# Patient Record
Sex: MALE | Race: AMERICAN INDIAN OR ALASKA NATIVE | NOT HISPANIC OR LATINO | URBAN - METROPOLITAN AREA
[De-identification: names, ages, dates, MRNs, and addresses within clinical notes are randomized per-mention and may not be internally consistent; named-entity substitution may affect disease eponyms.]

---

## 2021-05-29 ENCOUNTER — INPATIENT (INPATIENT)
Facility: HOSPITAL | Age: 56
LOS: 1 days | Discharge: HOME | End: 2021-05-31
Attending: COLON & RECTAL SURGERY | Admitting: COLON & RECTAL SURGERY
Payer: MEDICAID

## 2021-05-29 VITALS
RESPIRATION RATE: 18 BRPM | TEMPERATURE: 99 F | HEART RATE: 82 BPM | OXYGEN SATURATION: 98 % | DIASTOLIC BLOOD PRESSURE: 85 MMHG | WEIGHT: 179.9 LBS | SYSTOLIC BLOOD PRESSURE: 125 MMHG | HEIGHT: 72 IN

## 2021-05-29 LAB
APTT BLD: 33.1 SEC — SIGNIFICANT CHANGE UP (ref 27–39.2)
BASOPHILS # BLD AUTO: 0.03 K/UL — SIGNIFICANT CHANGE UP (ref 0–0.2)
BASOPHILS NFR BLD AUTO: 0.3 % — SIGNIFICANT CHANGE UP (ref 0–1)
EOSINOPHIL # BLD AUTO: 0.11 K/UL — SIGNIFICANT CHANGE UP (ref 0–0.7)
EOSINOPHIL NFR BLD AUTO: 0.9 % — SIGNIFICANT CHANGE UP (ref 0–8)
HCT VFR BLD CALC: 47.7 % — SIGNIFICANT CHANGE UP (ref 42–52)
HGB BLD-MCNC: 16.5 G/DL — SIGNIFICANT CHANGE UP (ref 14–18)
IMM GRANULOCYTES NFR BLD AUTO: 0.3 % — SIGNIFICANT CHANGE UP (ref 0.1–0.3)
INR BLD: 1.15 RATIO — SIGNIFICANT CHANGE UP (ref 0.65–1.3)
LYMPHOCYTES # BLD AUTO: 1.63 K/UL — SIGNIFICANT CHANGE UP (ref 1.2–3.4)
LYMPHOCYTES # BLD AUTO: 13.7 % — LOW (ref 20.5–51.1)
MCHC RBC-ENTMCNC: 31 PG — SIGNIFICANT CHANGE UP (ref 27–31)
MCHC RBC-ENTMCNC: 34.6 G/DL — SIGNIFICANT CHANGE UP (ref 32–37)
MCV RBC AUTO: 89.7 FL — SIGNIFICANT CHANGE UP (ref 80–94)
MONOCYTES # BLD AUTO: 1.29 K/UL — HIGH (ref 0.1–0.6)
MONOCYTES NFR BLD AUTO: 10.8 % — HIGH (ref 1.7–9.3)
NEUTROPHILS # BLD AUTO: 8.81 K/UL — HIGH (ref 1.4–6.5)
NEUTROPHILS NFR BLD AUTO: 74 % — SIGNIFICANT CHANGE UP (ref 42.2–75.2)
NRBC # BLD: 0 /100 WBCS — SIGNIFICANT CHANGE UP (ref 0–0)
PLATELET # BLD AUTO: 178 K/UL — SIGNIFICANT CHANGE UP (ref 130–400)
PROTHROM AB SERPL-ACNC: 13.2 SEC — HIGH (ref 9.95–12.87)
RBC # BLD: 5.32 M/UL — SIGNIFICANT CHANGE UP (ref 4.7–6.1)
RBC # FLD: 12.5 % — SIGNIFICANT CHANGE UP (ref 11.5–14.5)
WBC # BLD: 11.91 K/UL — HIGH (ref 4.8–10.8)
WBC # FLD AUTO: 11.91 K/UL — HIGH (ref 4.8–10.8)

## 2021-05-29 PROCEDURE — 99284 EMERGENCY DEPT VISIT MOD MDM: CPT | Mod: 57,25

## 2021-05-29 PROCEDURE — 99285 EMERGENCY DEPT VISIT HI MDM: CPT

## 2021-05-29 PROCEDURE — 74177 CT ABD & PELVIS W/CONTRAST: CPT | Mod: 26,MA

## 2021-05-29 RX ORDER — MORPHINE SULFATE 50 MG/1
4 CAPSULE, EXTENDED RELEASE ORAL ONCE
Refills: 0 | Status: DISCONTINUED | OUTPATIENT
Start: 2021-05-29 | End: 2021-05-29

## 2021-05-29 RX ORDER — CEFOTETAN DISODIUM 1 G
2 VIAL (EA) INJECTION ONCE
Refills: 0 | Status: COMPLETED | OUTPATIENT
Start: 2021-05-29 | End: 2021-05-29

## 2021-05-29 RX ORDER — ONDANSETRON 8 MG/1
4 TABLET, FILM COATED ORAL ONCE
Refills: 0 | Status: COMPLETED | OUTPATIENT
Start: 2021-05-29 | End: 2021-05-29

## 2021-05-29 RX ORDER — SODIUM CHLORIDE 9 MG/ML
1000 INJECTION INTRAMUSCULAR; INTRAVENOUS; SUBCUTANEOUS ONCE
Refills: 0 | Status: COMPLETED | OUTPATIENT
Start: 2021-05-29 | End: 2021-05-29

## 2021-05-29 RX ADMIN — ONDANSETRON 4 MILLIGRAM(S): 8 TABLET, FILM COATED ORAL at 23:38

## 2021-05-29 RX ADMIN — MORPHINE SULFATE 4 MILLIGRAM(S): 50 CAPSULE, EXTENDED RELEASE ORAL at 23:39

## 2021-05-29 RX ADMIN — SODIUM CHLORIDE 1000 MILLILITER(S): 9 INJECTION INTRAMUSCULAR; INTRAVENOUS; SUBCUTANEOUS at 23:38

## 2021-05-29 NOTE — ED PROVIDER NOTE - ATTENDING CONTRIBUTION TO CARE
I personally evaluated the patient. I reviewed the Resident’s or Physician Assistant’s note (as assigned above), and agree with the findings and plan except as documented in my note.  Chart reviewed.  55 y/o Chinese male, no PMH, presents with RLQ pain since yesterday.  No fever, anorexia or vomiting.  Exam shows alert patient in no distress, HEENT NCAT, lungs clear, RR S1S2, abdomen soft +RLQ tenderness +BS, +guarding, no rebound, no CCE.

## 2021-05-29 NOTE — ED PROVIDER NOTE - CLINICAL SUMMARY MEDICAL DECISION MAKING FREE TEXT BOX
Labs noted for WBC 11k.  CT abdo +appendicitis.  Given IVF, morphine, Zofran and cefotetan.  Will admit to surgery.

## 2021-05-29 NOTE — ED PROVIDER NOTE - PHYSICAL EXAMINATION
CONSTITUTIONAL: Well-appearing; well-nourished; in no apparent distress.   EYES: PERRL; EOM intact.   CARDIOVASCULAR: Normal S1, S2; no murmurs, rubs, or gallops.   RESPIRATORY: Normal chest excursion with respiration; breath sounds clear and equal bilaterally; no wheezes, rhonchi, or rales.  GI/: + RLQ/LLQ pain tenderness with guarding/rebound. mildly distended abdomen. + Rovsig and Arslan signs. Normal bowel sounds; no palpable organomegaly.   SKIN: Normal for age and race; warm; dry; good turgor; no apparent lesions or exudate.   NEURO/PSYCH: A & O x 4; grossly unremarkable.

## 2021-05-29 NOTE — ED PROVIDER NOTE - NS_BEDUNITTYPES_ED_ALL_ED
If no improvement after 2 weeks, Patient instructed to see DR Georgie Zavala for further evaluation. MED/SURG

## 2021-05-29 NOTE — ED PROVIDER NOTE - OBJECTIVE STATEMENT
57 yo male no sig hx present c/o RLQ pain x 1 day. pain worsen throughout the day. pain worsen with palpation and movement/walking. denies similar pain in the past. Denies fever/chill/nausea/vomiting/diarrhea/change of appetite/constipation and urinary sxs. Denies HA/dizziness/chest pain/sob/palpitations and pain to extremities and ambulatory difficulty.

## 2021-05-30 ENCOUNTER — RESULT REVIEW (OUTPATIENT)
Age: 56
End: 2021-05-30

## 2021-05-30 LAB
ALBUMIN SERPL ELPH-MCNC: 4.1 G/DL — SIGNIFICANT CHANGE UP (ref 3.5–5.2)
ALP SERPL-CCNC: 57 U/L — SIGNIFICANT CHANGE UP (ref 30–115)
ALT FLD-CCNC: 40 U/L — SIGNIFICANT CHANGE UP (ref 0–41)
ANION GAP SERPL CALC-SCNC: 10 MMOL/L — SIGNIFICANT CHANGE UP (ref 7–14)
ANION GAP SERPL CALC-SCNC: 11 MMOL/L — SIGNIFICANT CHANGE UP (ref 7–14)
AST SERPL-CCNC: 77 U/L — HIGH (ref 0–41)
BILIRUB SERPL-MCNC: 1.8 MG/DL — HIGH (ref 0.2–1.2)
BLD GP AB SCN SERPL QL: SIGNIFICANT CHANGE UP
BUN SERPL-MCNC: 11 MG/DL — SIGNIFICANT CHANGE UP (ref 10–20)
BUN SERPL-MCNC: 12 MG/DL — SIGNIFICANT CHANGE UP (ref 10–20)
CALCIUM SERPL-MCNC: 8.6 MG/DL — SIGNIFICANT CHANGE UP (ref 8.5–10.1)
CALCIUM SERPL-MCNC: 9.1 MG/DL — SIGNIFICANT CHANGE UP (ref 8.5–10.1)
CHLORIDE SERPL-SCNC: 100 MMOL/L — SIGNIFICANT CHANGE UP (ref 98–110)
CHLORIDE SERPL-SCNC: 104 MMOL/L — SIGNIFICANT CHANGE UP (ref 98–110)
CO2 SERPL-SCNC: 25 MMOL/L — SIGNIFICANT CHANGE UP (ref 17–32)
CO2 SERPL-SCNC: 25 MMOL/L — SIGNIFICANT CHANGE UP (ref 17–32)
CREAT SERPL-MCNC: 0.9 MG/DL — SIGNIFICANT CHANGE UP (ref 0.7–1.5)
CREAT SERPL-MCNC: 1 MG/DL — SIGNIFICANT CHANGE UP (ref 0.7–1.5)
FLUAV AG NPH QL: NEGATIVE COUNTS — SIGNIFICANT CHANGE UP
FLUBV AG NPH QL: NEGATIVE COUNTS — SIGNIFICANT CHANGE UP
GLUCOSE SERPL-MCNC: 119 MG/DL — HIGH (ref 70–99)
GLUCOSE SERPL-MCNC: 123 MG/DL — HIGH (ref 70–99)
HCT VFR BLD CALC: 44.7 % — SIGNIFICANT CHANGE UP (ref 42–52)
HGB BLD-MCNC: 15.6 G/DL — SIGNIFICANT CHANGE UP (ref 14–18)
LIDOCAIN IGE QN: 35 U/L — SIGNIFICANT CHANGE UP (ref 7–60)
MCHC RBC-ENTMCNC: 31.4 PG — HIGH (ref 27–31)
MCHC RBC-ENTMCNC: 34.9 G/DL — SIGNIFICANT CHANGE UP (ref 32–37)
MCV RBC AUTO: 89.9 FL — SIGNIFICANT CHANGE UP (ref 80–94)
NRBC # BLD: 0 /100 WBCS — SIGNIFICANT CHANGE UP (ref 0–0)
PLATELET # BLD AUTO: 161 K/UL — SIGNIFICANT CHANGE UP (ref 130–400)
POTASSIUM SERPL-MCNC: 3.7 MMOL/L — SIGNIFICANT CHANGE UP (ref 3.5–5)
POTASSIUM SERPL-MCNC: 6.3 MMOL/L — CRITICAL HIGH (ref 3.5–5)
POTASSIUM SERPL-SCNC: 3.7 MMOL/L — SIGNIFICANT CHANGE UP (ref 3.5–5)
POTASSIUM SERPL-SCNC: 6.3 MMOL/L — CRITICAL HIGH (ref 3.5–5)
PROT SERPL-MCNC: 7.5 G/DL — SIGNIFICANT CHANGE UP (ref 6–8)
RBC # BLD: 4.97 M/UL — SIGNIFICANT CHANGE UP (ref 4.7–6.1)
RBC # FLD: 12.5 % — SIGNIFICANT CHANGE UP (ref 11.5–14.5)
RSV RNA NPH QL NAA+NON-PROBE: NEGATIVE COUNTS — SIGNIFICANT CHANGE UP
SARS-COV-2 RNA SPEC QL NAA+PROBE: NEGATIVE COUNTS — SIGNIFICANT CHANGE UP
SODIUM SERPL-SCNC: 136 MMOL/L — SIGNIFICANT CHANGE UP (ref 135–146)
SODIUM SERPL-SCNC: 139 MMOL/L — SIGNIFICANT CHANGE UP (ref 135–146)
WBC # BLD: 11.1 K/UL — HIGH (ref 4.8–10.8)
WBC # FLD AUTO: 11.1 K/UL — HIGH (ref 4.8–10.8)

## 2021-05-30 PROCEDURE — 44970 LAPAROSCOPY APPENDECTOMY: CPT

## 2021-05-30 PROCEDURE — 71045 X-RAY EXAM CHEST 1 VIEW: CPT | Mod: 26

## 2021-05-30 PROCEDURE — 88304 TISSUE EXAM BY PATHOLOGIST: CPT | Mod: 26

## 2021-05-30 PROCEDURE — 93010 ELECTROCARDIOGRAM REPORT: CPT | Mod: NC

## 2021-05-30 RX ORDER — HEPARIN SODIUM 5000 [USP'U]/ML
5000 INJECTION INTRAVENOUS; SUBCUTANEOUS EVERY 8 HOURS
Refills: 0 | Status: DISCONTINUED | OUTPATIENT
Start: 2021-05-30 | End: 2021-05-31

## 2021-05-30 RX ORDER — ONDANSETRON 8 MG/1
4 TABLET, FILM COATED ORAL EVERY 6 HOURS
Refills: 0 | Status: DISCONTINUED | OUTPATIENT
Start: 2021-05-30 | End: 2021-05-30

## 2021-05-30 RX ORDER — OXYCODONE AND ACETAMINOPHEN 5; 325 MG/1; MG/1
1 TABLET ORAL ONCE
Refills: 0 | Status: DISCONTINUED | OUTPATIENT
Start: 2021-05-30 | End: 2021-05-31

## 2021-05-30 RX ORDER — CEFOTETAN DISODIUM 1 G
1 VIAL (EA) INJECTION EVERY 12 HOURS
Refills: 0 | Status: DISCONTINUED | OUTPATIENT
Start: 2021-05-30 | End: 2021-05-30

## 2021-05-30 RX ORDER — CEFOTETAN DISODIUM 1 G
1 VIAL (EA) INJECTION EVERY 12 HOURS
Refills: 0 | Status: DISCONTINUED | OUTPATIENT
Start: 2021-05-30 | End: 2021-05-31

## 2021-05-30 RX ORDER — MORPHINE SULFATE 50 MG/1
2 CAPSULE, EXTENDED RELEASE ORAL EVERY 4 HOURS
Refills: 0 | Status: DISCONTINUED | OUTPATIENT
Start: 2021-05-30 | End: 2021-05-30

## 2021-05-30 RX ORDER — HYDROMORPHONE HYDROCHLORIDE 2 MG/ML
0.5 INJECTION INTRAMUSCULAR; INTRAVENOUS; SUBCUTANEOUS
Refills: 0 | Status: DISCONTINUED | OUTPATIENT
Start: 2021-05-30 | End: 2021-05-31

## 2021-05-30 RX ORDER — FAMOTIDINE 10 MG/ML
20 INJECTION INTRAVENOUS EVERY 12 HOURS
Refills: 0 | Status: DISCONTINUED | OUTPATIENT
Start: 2021-05-30 | End: 2021-05-30

## 2021-05-30 RX ORDER — SODIUM CHLORIDE 9 MG/ML
1000 INJECTION INTRAMUSCULAR; INTRAVENOUS; SUBCUTANEOUS
Refills: 0 | Status: DISCONTINUED | OUTPATIENT
Start: 2021-05-30 | End: 2021-05-30

## 2021-05-30 RX ORDER — FAMOTIDINE 10 MG/ML
20 INJECTION INTRAVENOUS EVERY 12 HOURS
Refills: 0 | Status: DISCONTINUED | OUTPATIENT
Start: 2021-05-30 | End: 2021-05-31

## 2021-05-30 RX ORDER — MORPHINE SULFATE 50 MG/1
2 CAPSULE, EXTENDED RELEASE ORAL EVERY 4 HOURS
Refills: 0 | Status: DISCONTINUED | OUTPATIENT
Start: 2021-05-30 | End: 2021-05-31

## 2021-05-30 RX ORDER — SODIUM CHLORIDE 9 MG/ML
1000 INJECTION, SOLUTION INTRAVENOUS
Refills: 0 | Status: DISCONTINUED | OUTPATIENT
Start: 2021-05-30 | End: 2021-05-31

## 2021-05-30 RX ADMIN — FAMOTIDINE 20 MILLIGRAM(S): 10 INJECTION INTRAVENOUS at 18:10

## 2021-05-30 RX ADMIN — HEPARIN SODIUM 5000 UNIT(S): 5000 INJECTION INTRAVENOUS; SUBCUTANEOUS at 15:28

## 2021-05-30 RX ADMIN — Medication 100 GRAM(S): at 00:33

## 2021-05-30 RX ADMIN — HEPARIN SODIUM 5000 UNIT(S): 5000 INJECTION INTRAVENOUS; SUBCUTANEOUS at 21:38

## 2021-05-30 RX ADMIN — MORPHINE SULFATE 2 MILLIGRAM(S): 50 CAPSULE, EXTENDED RELEASE ORAL at 11:28

## 2021-05-30 RX ADMIN — FAMOTIDINE 20 MILLIGRAM(S): 10 INJECTION INTRAVENOUS at 05:39

## 2021-05-30 RX ADMIN — SODIUM CHLORIDE 75 MILLILITER(S): 9 INJECTION, SOLUTION INTRAVENOUS at 10:59

## 2021-05-30 RX ADMIN — SODIUM CHLORIDE 75 MILLILITER(S): 9 INJECTION, SOLUTION INTRAVENOUS at 21:39

## 2021-05-30 RX ADMIN — Medication 100 GRAM(S): at 17:44

## 2021-05-30 RX ADMIN — MORPHINE SULFATE 4 MILLIGRAM(S): 50 CAPSULE, EXTENDED RELEASE ORAL at 09:45

## 2021-05-30 RX ADMIN — MORPHINE SULFATE 2 MILLIGRAM(S): 50 CAPSULE, EXTENDED RELEASE ORAL at 11:29

## 2021-05-30 RX ADMIN — SODIUM CHLORIDE 125 MILLILITER(S): 9 INJECTION INTRAMUSCULAR; INTRAVENOUS; SUBCUTANEOUS at 02:40

## 2021-05-30 NOTE — PRE-ANESTHESIA EVALUATION ADULT - NSANTHOSAYNRD_GEN_A_CORE
No. ROGER screening performed.  STOP BANG Legend: 0-2 = LOW Risk; 3-4 = INTERMEDIATE Risk; 5-8 = HIGH Risk

## 2021-05-30 NOTE — H&P ADULT - ATTENDING COMMENTS
Patient is a 56M with no PMH who presents with 1 day of abdominal pain and found to have acute appendicitis on CTAP    Patient seen and examined.    Abdomen - soft, TTP in RLQ, mildly distended    Vitals labs and images reviewed    Plan  - NPO  - IVF  - IV Cefotetan  - OR for laparoscopic possible open appendectomy

## 2021-05-30 NOTE — H&P ADULT - MUSCULOSKELETAL
Only take Warfarin 5 mg (1 tablet) tomorrow.  Then, resume normal dosing schedule of Warfarin 10 mg on Mondays, Wednesdays, and Fridays and 7.5 mg all other days.  Continue to elevate legs when sitting.  Follow up pending lab results.  Follow up as scheduled in 10 days, or sooner if symptoms persist or worsen.   No joint pain, swelling or deformity; no limitation of movement

## 2021-05-30 NOTE — PROGRESS NOTE ADULT - ATTENDING COMMENTS
56M POD1 s/p laparoscopic appendectomy for acute perforated appendicitis    Patient seen and examined.    Abdomen - soft, mildy distended, appropriately tender.  Wounds with dermabond in place.     Vitals labs and images reviewed    Plan  - regular diet  - 5 days of PO Augmentin given perforation  - DC home today vs tomorrow  - f/u as outpatient in 1-2 weeks.

## 2021-05-30 NOTE — PROGRESS NOTE ADULT - SUBJECTIVE AND OBJECTIVE BOX
.  Post Operative Note --> s/p Laparoscopic Appendectomy today      Patient    Pain controlled with pain medications.    Voided freely.  Denies N/V, subjective fever, chills, tremors, CP or SOB.         Vitals: T(F): 97.8 (05-30-21 @ 16:10), Max: 99.6 (05-30-21 @ 09:38)  HR: 104 (05-30-21 @ 16:10)  BP: 120/80 (05-30-21 @ 16:10) (112/71 - 154/89)  RR: 16 (05-30-21 @ 16:10)  SpO2: 95% (05-30-21 @ 10:53)          I&O's:  In:   05-30-21 @ 07:01  -  05-30-21 @ 17:30  --------------------------------------------------------  IN: 1150 mL      IV Fluids: lactated ringers. 1000 milliLiter(s) (75 mL/Hr) IV Continuous <Continuous>      Out:   05-30-21 @ 07:01  -  05-30-21 @ 17:30  --------------------------------------------------------  OUT: 420 mL      EBL:   05-30-21 @ 07:01  -  05-30-21 @ 17:30  --------------------------------------------------------  OUT: 20 mL      Voided Urine:   05-30-21 @ 07:01  -  05-30-21 @ 17:30  --------------------------------------------------------  OUT: 420 mL            Physical Examination:  General:  Awake, alert and conversant in NAD.   Lungs:  CTA bilaterally, No W/R/R.  Cor:  S1 & S2 RRR, No M/R/G.  Abd:  + BS, soft , no distention, + RLQ and incisional tenderness with palpation.  Tegaderm dressings C/D/I, no bleeding or discharge.  No rebound, guarding or peritoneal signs.   Ext:  No C/C/E,  No calf tenderness.  Neuro:  No focal deficits, no facial droop.        Medications: [Standing]  cefoTEtan  IVPB 1 Gram(s) IV Intermittent every 12 hours  famotidine Injectable 20 milliGRAM(s) IV Push every 12 hours  heparin   Injectable 5000 Unit(s) SubCutaneous every 8 hours  HYDROmorphone  Injectable 0.5 milliGRAM(s) IV Push every 10 minutes PRN  lactated ringers. 1000 milliLiter(s) IV Continuous <Continuous>  morphine  - Injectable 2 milliGRAM(s) IV Push every 4 hours PRN  oxycodone    5 mG/acetaminophen 325 mG 1 Tablet(s) Oral once PRN    Medications: [PRN]  cefoTEtan  IVPB 1 Gram(s) IV Intermittent every 12 hours  famotidine Injectable 20 milliGRAM(s) IV Push every 12 hours  heparin   Injectable 5000 Unit(s) SubCutaneous every 8 hours  HYDROmorphone  Injectable 0.5 milliGRAM(s) IV Push every 10 minutes PRN  lactated ringers. 1000 milliLiter(s) IV Continuous <Continuous>  morphine  - Injectable 2 milliGRAM(s) IV Push every 4 hours PRN  oxycodone    5 mG/acetaminophen 325 mG 1 Tablet(s) Oral once PRN          Labs:  No post operative labs ordered.       Imaging:  No post-operative imaging studies ordered.    .  Post Operative Note --> s/p Laparoscopic Appendectomy today      Patient reports some mild pain but feels well.   Voided freely.  Denies N/V, subjective fever, chills, tremors, CP or SOB.         Vitals: T(F): 97.8 (05-30-21 @ 16:10), Max: 99.6 (05-30-21 @ 09:38)  HR: 104 (05-30-21 @ 16:10)  BP: 120/80 (05-30-21 @ 16:10) (112/71 - 154/89)  RR: 16 (05-30-21 @ 16:10)  SpO2: 95% (05-30-21 @ 10:53)        I&O's:  In:   05-30-21 @ 07:01  -  05-30-21 @ 17:30  --------------------------------------------------------  IN: 1150 mL    IV Fluids: lactated ringers. 1000 milliLiter(s) (75 mL/Hr) IV Continuous <Continuous>    Out:   05-30-21 @ 07:01  -  05-30-21 @ 17:30  --------------------------------------------------------  OUT: 420 mL    EBL:   05-30-21 @ 07:01  -  05-30-21 @ 17:30  --------------------------------------------------------  OUT: 20 mL    Voided Urine:   05-30-21 @ 07:01  -  05-30-21 @ 17:30  --------------------------------------------------------  OUT: 420 mL          Physical Examination:  General:  Awake, alert in NAD.   Lungs:  CTA bilaterally, No W/R/R.  Cor:  S1 & S2 RRR, No M/R/G.  Abd:  + BS, soft , no distention, + RLQ and incisional tenderness with palpation.  Incisions with SQ sutures and Derma bound glue C/D/I, no bleeding or discharge noted.  No rebound, guarding or peritoneal signs.   Ext:  No C/C/E,  No calf tenderness.  Neuro:  No focal deficits, no facial droop.        Medications: [Standing]  cefoTEtan  IVPB 1 Gram(s) IV Intermittent every 12 hours  famotidine Injectable 20 milliGRAM(s) IV Push every 12 hours  heparin   Injectable 5000 Unit(s) SubCutaneous every 8 hours  HYDROmorphone  Injectable 0.5 milliGRAM(s) IV Push every 10 minutes PRN  lactated ringers. 1000 milliLiter(s) IV Continuous <Continuous>  morphine  - Injectable 2 milliGRAM(s) IV Push every 4 hours PRN  oxycodone    5 mG/acetaminophen 325 mG 1 Tablet(s) Oral once PRN    Medications: [PRN]  cefoTEtan  IVPB 1 Gram(s) IV Intermittent every 12 hours  famotidine Injectable 20 milliGRAM(s) IV Push every 12 hours  heparin   Injectable 5000 Unit(s) SubCutaneous every 8 hours  HYDROmorphone  Injectable 0.5 milliGRAM(s) IV Push every 10 minutes PRN  lactated ringers. 1000 milliLiter(s) IV Continuous <Continuous>  morphine  - Injectable 2 milliGRAM(s) IV Push every 4 hours PRN  oxycodone    5 mG/acetaminophen 325 mG 1 Tablet(s) Oral once PRN          Labs:  No post operative labs ordered.       Imaging:  No post-operative imaging studies ordered.

## 2021-05-30 NOTE — H&P ADULT - ASSESSMENT
55 Y/O  MALE  NO SIGNIFICANT  PMH, NO PRIOR  SURGERIES  ACUTE APPENDICITIS  NPO  IVF   NS  @125CC.HR  IV ABX-- CEFOTETAN   PAIN MGMT-- MORPHINE  PN  ZOFRAN PRN  NAUSEA  DVT/  GI  PROPHYLAXIS--- PEPCID/  SCDS  WILL FOLLOW

## 2021-05-30 NOTE — BRIEF OPERATIVE NOTE - OPERATION/FINDINGS
Acute perforated appendicitis observed upon localizing appendix. There was significant inflammatory changes involving the omentum in the RLQ.  Appendix stapled at base with 45 mm EndoGIA purple load, mesoappendix stapled with EndoGIA 45mm gold load. Fascia closed with #0 Vicryl suture, skin closed with 4-0 Monocryl in subcuticular fashion. Dermabond used.

## 2021-05-30 NOTE — H&P ADULT - HISTORY OF PRESENT ILLNESS
55 Y/O  MANDARIN SPEAKING  MALE, NO SIGNIFICANT  PMH,  NO PRIOR  SURGERIES, PRESENTS  TO  ED  WITH   C/O RLQ  ABDOMINAL PAIN   X one  day, sharp,  10/10, non radiating, constant, no aggravating/  no relieving  factors. Denies  cp, no sob,no nvd, no fever,  no chills

## 2021-05-31 ENCOUNTER — TRANSCRIPTION ENCOUNTER (OUTPATIENT)
Age: 56
End: 2021-05-31

## 2021-05-31 VITALS
TEMPERATURE: 96 F | DIASTOLIC BLOOD PRESSURE: 74 MMHG | HEART RATE: 60 BPM | RESPIRATION RATE: 16 BRPM | SYSTOLIC BLOOD PRESSURE: 111 MMHG

## 2021-05-31 LAB
HCV AB S/CO SERPL IA: 0.03 COI — SIGNIFICANT CHANGE UP
HCV AB SERPL-IMP: SIGNIFICANT CHANGE UP

## 2021-05-31 RX ORDER — IBUPROFEN 200 MG
400 TABLET ORAL EVERY 6 HOURS
Refills: 0 | Status: DISCONTINUED | OUTPATIENT
Start: 2021-05-31 | End: 2021-05-31

## 2021-05-31 RX ORDER — IBUPROFEN 200 MG
1 TABLET ORAL
Qty: 0 | Refills: 0 | DISCHARGE
Start: 2021-05-31

## 2021-05-31 RX ORDER — ACETAMINOPHEN 500 MG
650 TABLET ORAL EVERY 4 HOURS
Refills: 0 | Status: DISCONTINUED | OUTPATIENT
Start: 2021-05-31 | End: 2021-05-31

## 2021-05-31 RX ORDER — ACETAMINOPHEN 500 MG
2 TABLET ORAL
Qty: 0 | Refills: 0 | DISCHARGE
Start: 2021-05-31

## 2021-05-31 RX ADMIN — HEPARIN SODIUM 5000 UNIT(S): 5000 INJECTION INTRAVENOUS; SUBCUTANEOUS at 05:20

## 2021-05-31 RX ADMIN — SODIUM CHLORIDE 75 MILLILITER(S): 9 INJECTION, SOLUTION INTRAVENOUS at 11:42

## 2021-05-31 RX ADMIN — HEPARIN SODIUM 5000 UNIT(S): 5000 INJECTION INTRAVENOUS; SUBCUTANEOUS at 13:15

## 2021-05-31 RX ADMIN — FAMOTIDINE 20 MILLIGRAM(S): 10 INJECTION INTRAVENOUS at 05:18

## 2021-05-31 RX ADMIN — Medication 100 GRAM(S): at 05:18

## 2021-05-31 NOTE — DISCHARGE NOTE NURSING/CASE MANAGEMENT/SOCIAL WORK - PATIENT PORTAL LINK FT
You can access the FollowMyHealth Patient Portal offered by [facility] by registering at the following website: [email or web address]. By joining [employer]’s FollowMyHealth portal, you will also be able to view your health information using other applications (apps) compatible with our system.

## 2021-05-31 NOTE — PROGRESS NOTE ADULT - ASSESSMENT
POD#1 s/p laparoscopic appendectomy with mild post-op ileus    Case D/w Dr. Aburto:   - encourage OOB/ambulation: once bowel function/urinating improves, will D/C home   - continue regular diet   - pain mgmt PRN: tylenol/motrin   - D/C home on augmentin x 1 week   - GI/DVT prophylaxis
Assessment:  57 y/o Male patient S/P Laparoscopic Appendectomy for Acute perforated appendicitis today.         Plan:  - Diet:  Resume regular diet as tolerated.  - Ivabx:  Cefotetan in hospital and will discharge on PO Augmentin x 5 days.  - Pain medications:  Oxycodone, Morphine PRN and Dilaudid for severe pain PRN.   - VTE prophylaxis:  Heparin 5000 units SQ q 8 hours / SCD's / OOB and ambulate.  - GI prophylaxis:  Pepcid BID / Zofran PRN N/V.  - Encourage OOB to chair and ambulate with assistance and incentive spirometer 10 x's q hour.   - Anticipate discharge home in am if remains afebrile and stable with Rx of Augmentin x 5 days and F/U with Dr. Aburto in one week.

## 2021-05-31 NOTE — DISCHARGE NOTE PROVIDER - NSDCMRMEDTOKEN_GEN_ALL_CORE_FT
acetaminophen 325 mg oral tablet: 2 tab(s) orally every 4 hours, As needed, Mild Pain (1 - 3)  amoxicillin-clavulanate 875 mg-125 mg oral tablet: 1 tab(s) orally every 12 hours   ibuprofen 400 mg oral tablet: 1 tab(s) orally every 6 hours, As needed, Moderate Pain (4 - 6)

## 2021-05-31 NOTE — DISCHARGE NOTE PROVIDER - HOSPITAL COURSE
HPI:  57 Y/O  MANDARIN SPEAKING  MALE, NO SIGNIFICANT  PMH,  NO PRIOR  SURGERIES, PRESENTS  TO  ED  WITH   C/O RLQ  ABDOMINAL PAIN   X one  day, sharp,  10/10, non radiating, constant, no aggravating/  no relieving  factors. Denies  cp, no sob,no nvd, no fever,  no chills (30 May 2021 00:33)    CT A/P: acute appendicitis; wbc = 11; placed on cefotetan    s/p lap appy 5/30. He remained afebrile, able to tolerate diet, voiding freely  D/C'd 5/31 w/augmentin 7 days

## 2021-05-31 NOTE — PROGRESS NOTE ADULT - SUBJECTIVE AND OBJECTIVE BOX
[employer]IA [employer]  #[id number]    S; Pt states he has mild nusrat-incisional pain but feels bloated and having some urinary hesitancy. Felt light-headed upon awakening but this is improving. Denies N/V; tolerating some solid food  O; Vital Signs Last 24 Hrs  T(C): 36.4 (31 May 2021 05:00), Max: 37.2 (30 May 2021 11:08)  T(F): 97.6 (31 May 2021 05:00), Max: 98.9 (30 May 2021 11:08)  HR: 89 (31 May 2021 05:00) (82 - 104)  BP: 112/72 (31 May 2021 05:00) (112/72 - 140/83)  BP(mean): --  RR: 16 (31 May 2021 05:00) (12 - 17)  SpO2: 95% (30 May 2021 10:53) (95% - 95%)    I&O's Detail    30 May 2021 07:01  -  31 May 2021 07:00  --------------------------------------------------------  IN:    Lactated Ringers: 1750 mL  Total IN: 1750 mL    OUT:    Estimated Blood Loss (mL): 20 mL    Voided (mL): 1500 mL  Total OUT: 1520 mL    Total NET: 230 mL      31 May 2021 07:01  -  31 May 2021 10:32  --------------------------------------------------------  IN:  Total IN: 0 mL    OUT:    Voided (mL): 450 mL  Total OUT: 450 mL    Total NET: -450 mL    MEDICATIONS  (STANDING):  cefoTEtan  IVPB 1 Gram(s) IV Intermittent every 12 hours  famotidine Injectable 20 milliGRAM(s) IV Push every 12 hours  heparin   Injectable 5000 Unit(s) SubCutaneous every 8 hours  lactated ringers. 1000 milliLiter(s) (75 mL/Hr) IV Continuous <Continuous>    MEDICATIONS  (PRN):  HYDROmorphone  Injectable 0.5 milliGRAM(s) IV Push every 10 minutes PRN Severe Pain (7 - 10)  morphine  - Injectable 2 milliGRAM(s) IV Push every 4 hours PRN Moderate Pain (4 - 6)  oxycodone    5 mG/acetaminophen 325 mG 1 Tablet(s) Oral once PRN Moderate Pain (4 - 6)    EXAM:  lungs: cta  cvs: s1s2  abd: soft, distended, tympanic; dressings c/d/i; +mild nusrat-incisional tenderness  ext: no LE edema noted    labs: no new results today VIA [employer]  #[id number]    S; Pt states he has mild nusrat-incisional pain but feels bloated and having some urinary hesitancy. Felt light-headed upon awakening but this is improving. Denies N/V; tolerating some solid food  O; Vital Signs Last 24 Hrs  T(C): 36.4 (31 May 2021 05:00), Max: 37.2 (30 May 2021 11:08)  T(F): 97.6 (31 May 2021 05:00), Max: 98.9 (30 May 2021 11:08)  HR: 89 (31 May 2021 05:00) (82 - 104)  BP: 112/72 (31 May 2021 05:00) (112/72 - 140/83)  BP(mean): --  RR: 16 (31 May 2021 05:00) (12 - 17)  SpO2: 95% (30 May 2021 10:53) (95% - 95%)    I&O's Detail    30 May 2021 07:01  -  31 May 2021 07:00  --------------------------------------------------------  IN:    Lactated Ringers: 1750 mL  Total IN: 1750 mL    OUT:    Estimated Blood Loss (mL): 20 mL    Voided (mL): 1500 mL  Total OUT: 1520 mL    Total NET: 230 mL      31 May 2021 07:01  -  31 May 2021 10:32  --------------------------------------------------------  IN:  Total IN: 0 mL    OUT:    Voided (mL): 450 mL  Total OUT: 450 mL    Total NET: -450 mL    MEDICATIONS  (STANDING):  cefoTEtan  IVPB 1 Gram(s) IV Intermittent every 12 hours  famotidine Injectable 20 milliGRAM(s) IV Push every 12 hours  heparin   Injectable 5000 Unit(s) SubCutaneous every 8 hours  lactated ringers. 1000 milliLiter(s) (75 mL/Hr) IV Continuous <Continuous>    MEDICATIONS  (PRN):  HYDROmorphone  Injectable 0.5 milliGRAM(s) IV Push every 10 minutes PRN Severe Pain (7 - 10)  morphine  - Injectable 2 milliGRAM(s) IV Push every 4 hours PRN Moderate Pain (4 - 6)  oxycodone    5 mG/acetaminophen 325 mG 1 Tablet(s) Oral once PRN Moderate Pain (4 - 6)    EXAM:  lungs: cta  cvs: s1s2  abd: soft, distended, tympanic; incisions w/dermabond  c/d/i; +mild nusrat-incisional tenderness  ext: no LE edema noted    labs: no new results today

## 2021-05-31 NOTE — DISCHARGE NOTE PROVIDER - NSDCFUADDINST_GEN_ALL_CORE_FT
If you experience increased abdominal pain, nausea/vomiting or fevers - return to emergency room    Follow up in 1 week in office - please call and make appointment    Take antibiotic (Augmentin) as directed

## 2021-05-31 NOTE — DISCHARGE NOTE PROVIDER - CARE PROVIDER_API CALL
Usman Aburto)  ColonRectal Surgery; Surgery  [address], [address] [place] [address]  Phone: [phone]  Fax: [phone]  Follow Up Time: 1 week

## 2021-06-01 LAB
COVID-19 SPIKE DOMAIN AB INTERP: POSITIVE
COVID-19 SPIKE DOMAIN ANTIBODY RESULT: >250 U/ML — HIGH
SARS-COV-2 IGG+IGM SERPL QL IA: >250 U/ML — HIGH
SARS-COV-2 IGG+IGM SERPL QL IA: POSITIVE

## 2021-06-02 DIAGNOSIS — K37 UNSPECIFIED APPENDICITIS: ICD-10-CM

## 2021-06-02 DIAGNOSIS — K56.7 ILEUS, UNSPECIFIED: ICD-10-CM

## 2021-06-02 DIAGNOSIS — K35.32 ACUTE APPENDICITIS WITH PERFORATION, LOCALIZED PERITONITIS, AND GANGRENE, WITHOUT ABSCESS: ICD-10-CM

## 2021-06-02 DIAGNOSIS — R39.11 HESITANCY OF MICTURITION: ICD-10-CM

## 2021-06-09 PROBLEM — Z00.00 ENCOUNTER FOR PREVENTIVE HEALTH EXAMINATION: Status: ACTIVE | Noted: 2021-06-09

## 2021-06-09 LAB — SURGICAL PATHOLOGY STUDY: SIGNIFICANT CHANGE UP

## 2021-06-21 ENCOUNTER — APPOINTMENT (OUTPATIENT)
Dept: SURGERY | Facility: CLINIC | Age: 56
End: 2021-06-21
Payer: MEDICAID

## 2021-06-21 VITALS
DIASTOLIC BLOOD PRESSURE: 80 MMHG | TEMPERATURE: 97.1 F | HEIGHT: 68 IN | HEART RATE: 79 BPM | SYSTOLIC BLOOD PRESSURE: 120 MMHG

## 2021-06-21 DIAGNOSIS — K35.80 UNSPECIFIED ACUTE APPENDICITIS: ICD-10-CM

## 2021-06-21 DIAGNOSIS — Z98.890 OTHER SPECIFIED POSTPROCEDURAL STATES: ICD-10-CM

## 2021-06-21 PROCEDURE — 99024 POSTOP FOLLOW-UP VISIT: CPT

## 2021-06-21 PROCEDURE — T1013: CPT

## 2021-06-21 NOTE — PHYSICAL EXAM
[Abdomen Masses] : No abdominal masses [Abdomen Tenderness] : ~T No ~M abdominal tenderness [Tender] : nontender [Enlarged] : not enlarged

## 2021-06-21 NOTE — HISTORY OF PRESENT ILLNESS
[FreeTextEntry1] : 56 year old M with no significant PMH/PSH presented to the ED with complaints of RLQ pain for 1 day. CT revealed acute appendicitis. DR Aburto performed a laparoscopic appendectomy on 5/30/21 without complication. postop course was without complication and pt was discharged home. \par \par Pt presents today requesting a Ct scan for his umbilical pain. Pt denies fever, chills nausea vomiting, abdominal pain when he is not self palpating, diarrhea, blood in stool, unexpected weight loss\par \par Pt reports having a BM every 2 days and it is often hard. pt reports minimal fluid intake

## 2021-06-21 NOTE — ASSESSMENT
[FreeTextEntry1] : Pt's surgical incisions are healing well/ incisions are clean dry and do not have erythema, induration, drainage or bleeding. There is no evidence of hernia at any of his incisions. There was no pain or guarding noted during examination or palpation. \par \par Pt's abdomen is soft, nontender and with mild distension. Pt reports his diet consist of rice, meat and the only fruit/vegetable he eats are bananas rarely. \par \par Pt educated conservative management of constipation. PT instructed on a high fiber diet, psyllium husk fiber, increasing water intake, and  Colace and MiraLAX as needed. \par \par Pt educated on worsening symptoms and when a CT scan would be necessary. \par \par All questions answered. Pt verbalized understanding. Pt instructed to call myself or the office back with any further questions or issues.\par \par \par Pathology reviewed with patient and his son

## 2021-06-21 NOTE — REVIEW OF SYSTEMS
[Abdominal Pain] : abdominal pain [Constipation] : constipation [Vomiting] : no vomiting [Diarrhea] : no diarrhea [Heartburn] : no heartburn [Melena] : no melena [Negative] : Psychiatric [FreeTextEntry7] : PT reports pain with self palpation of umbilical incision. Pt did not express pain during examination  [de-identified] : surgical incisions healing

## 2021-06-21 NOTE — REASON FOR VISIT
[Post Op: _________] : a [unfilled] post op visit [Family Member] : family member [[employer] ] : provided by [employer]   [Time Spent: ____ minutes] : I have spent [unfilled] minutes of time on the encounter. The patient's primary language is not English thus required  services. [FreeTextEntry1] : [id number] [FreeTextEntry3] : Mandarin [TWNoteComboBox1] : Chinese

## 2021-09-20 ENCOUNTER — APPOINTMENT (OUTPATIENT)
Dept: SURGERY | Facility: CLINIC | Age: 56
End: 2021-09-20

## 2024-08-16 NOTE — ED PROVIDER NOTE - NS ED ROS FT
Constitutional: no fever, chills, no recent weight loss, change in appetite or malaise  Eyes: no redness/discharge/pain/vision changes  ENT: no rhinorrhea/ear pain/sore throat  Cardiac: No chest pain, SOB or edema.  Respiratory: No cough or respiratory distress  GI: see HPI  : No dysuria, frequency, urgency or hematuria  MS: no pain to back or extremities, no loss of ROM, no weakness  Neuro: No headache or weakness. No LOC.  Skin: No skin rash.  Endocrine: No history of thyroid disease or diabetes.
yes

## 2024-11-17 ENCOUNTER — INPATIENT (INPATIENT)
Facility: HOSPITAL | Age: 59
LOS: 1 days | Discharge: HOME | DRG: 383 | End: 2024-11-19
Attending: STUDENT IN AN ORGANIZED HEALTH CARE EDUCATION/TRAINING PROGRAM | Admitting: INTERNAL MEDICINE
Payer: MEDICAID

## 2024-11-17 VITALS
RESPIRATION RATE: 16 BRPM | SYSTOLIC BLOOD PRESSURE: 136 MMHG | DIASTOLIC BLOOD PRESSURE: 84 MMHG | HEART RATE: 85 BPM | TEMPERATURE: 98 F | OXYGEN SATURATION: 95 % | WEIGHT: 190.04 LBS

## 2024-11-17 DIAGNOSIS — B02.9 ZOSTER WITHOUT COMPLICATIONS: ICD-10-CM

## 2024-11-17 LAB
ALBUMIN SERPL ELPH-MCNC: 4.2 G/DL — SIGNIFICANT CHANGE UP (ref 3.5–5.2)
ALP SERPL-CCNC: 65 U/L — SIGNIFICANT CHANGE UP (ref 30–115)
ALT FLD-CCNC: 28 U/L — SIGNIFICANT CHANGE UP (ref 0–41)
ANION GAP SERPL CALC-SCNC: 11 MMOL/L — SIGNIFICANT CHANGE UP (ref 7–14)
AST SERPL-CCNC: 23 U/L — SIGNIFICANT CHANGE UP (ref 0–41)
BASOPHILS # BLD AUTO: 0.03 K/UL — SIGNIFICANT CHANGE UP (ref 0–0.2)
BASOPHILS NFR BLD AUTO: 0.5 % — SIGNIFICANT CHANGE UP (ref 0–1)
BILIRUB SERPL-MCNC: 1.2 MG/DL — SIGNIFICANT CHANGE UP (ref 0.2–1.2)
BUN SERPL-MCNC: 11 MG/DL — SIGNIFICANT CHANGE UP (ref 10–20)
CALCIUM SERPL-MCNC: 8.6 MG/DL — SIGNIFICANT CHANGE UP (ref 8.4–10.5)
CHLORIDE SERPL-SCNC: 102 MMOL/L — SIGNIFICANT CHANGE UP (ref 98–110)
CO2 SERPL-SCNC: 27 MMOL/L — SIGNIFICANT CHANGE UP (ref 17–32)
CREAT SERPL-MCNC: 0.9 MG/DL — SIGNIFICANT CHANGE UP (ref 0.7–1.5)
EGFR: 97 ML/MIN/1.73M2 — SIGNIFICANT CHANGE UP
EOSINOPHIL # BLD AUTO: 0.06 K/UL — SIGNIFICANT CHANGE UP (ref 0–0.7)
EOSINOPHIL NFR BLD AUTO: 1 % — SIGNIFICANT CHANGE UP (ref 0–8)
GLUCOSE SERPL-MCNC: 190 MG/DL — HIGH (ref 70–99)
HCT VFR BLD CALC: 47.9 % — SIGNIFICANT CHANGE UP (ref 42–52)
HGB BLD-MCNC: 16.1 G/DL — SIGNIFICANT CHANGE UP (ref 14–18)
IMM GRANULOCYTES NFR BLD AUTO: 0.3 % — SIGNIFICANT CHANGE UP (ref 0.1–0.3)
LYMPHOCYTES # BLD AUTO: 0.95 K/UL — LOW (ref 1.2–3.4)
LYMPHOCYTES # BLD AUTO: 15.6 % — LOW (ref 20.5–51.1)
MCHC RBC-ENTMCNC: 30.4 PG — SIGNIFICANT CHANGE UP (ref 27–31)
MCHC RBC-ENTMCNC: 33.6 G/DL — SIGNIFICANT CHANGE UP (ref 32–37)
MCV RBC AUTO: 90.5 FL — SIGNIFICANT CHANGE UP (ref 80–94)
MONOCYTES # BLD AUTO: 0.89 K/UL — HIGH (ref 0.1–0.6)
MONOCYTES NFR BLD AUTO: 14.6 % — HIGH (ref 1.7–9.3)
NEUTROPHILS # BLD AUTO: 4.13 K/UL — SIGNIFICANT CHANGE UP (ref 1.4–6.5)
NEUTROPHILS NFR BLD AUTO: 68 % — SIGNIFICANT CHANGE UP (ref 42.2–75.2)
NRBC # BLD: 0 /100 WBCS — SIGNIFICANT CHANGE UP (ref 0–0)
PLATELET # BLD AUTO: 132 K/UL — SIGNIFICANT CHANGE UP (ref 130–400)
PMV BLD: 10 FL — SIGNIFICANT CHANGE UP (ref 7.4–10.4)
POTASSIUM SERPL-MCNC: 3.5 MMOL/L — SIGNIFICANT CHANGE UP (ref 3.5–5)
POTASSIUM SERPL-SCNC: 3.5 MMOL/L — SIGNIFICANT CHANGE UP (ref 3.5–5)
PROT SERPL-MCNC: 6.8 G/DL — SIGNIFICANT CHANGE UP (ref 6–8)
RBC # BLD: 5.29 M/UL — SIGNIFICANT CHANGE UP (ref 4.7–6.1)
RBC # FLD: 12.3 % — SIGNIFICANT CHANGE UP (ref 11.5–14.5)
SODIUM SERPL-SCNC: 140 MMOL/L — SIGNIFICANT CHANGE UP (ref 135–146)
WBC # BLD: 6.08 K/UL — SIGNIFICANT CHANGE UP (ref 4.8–10.8)
WBC # FLD AUTO: 6.08 K/UL — SIGNIFICANT CHANGE UP (ref 4.8–10.8)

## 2024-11-17 PROCEDURE — 87798 DETECT AGENT NOS DNA AMP: CPT

## 2024-11-17 PROCEDURE — 80048 BASIC METABOLIC PNL TOTAL CA: CPT

## 2024-11-17 PROCEDURE — 80053 COMPREHEN METABOLIC PANEL: CPT

## 2024-11-17 PROCEDURE — 36415 COLL VENOUS BLD VENIPUNCTURE: CPT

## 2024-11-17 PROCEDURE — 87389 HIV-1 AG W/HIV-1&-2 AB AG IA: CPT

## 2024-11-17 PROCEDURE — 99285 EMERGENCY DEPT VISIT HI MDM: CPT

## 2024-11-17 PROCEDURE — 85025 COMPLETE CBC W/AUTO DIFF WBC: CPT

## 2024-11-17 PROCEDURE — 83735 ASSAY OF MAGNESIUM: CPT

## 2024-11-17 PROCEDURE — 83036 HEMOGLOBIN GLYCOSYLATED A1C: CPT

## 2024-11-17 PROCEDURE — 99222 1ST HOSP IP/OBS MODERATE 55: CPT

## 2024-11-17 PROCEDURE — 87529 HSV DNA AMP PROBE: CPT

## 2024-11-17 RX ORDER — ACYCLOVIR 200 MG
1000 CAPSULE ORAL ONCE
Refills: 0 | Status: DISCONTINUED | OUTPATIENT
Start: 2024-11-17 | End: 2024-11-17

## 2024-11-17 RX ORDER — SODIUM CHLORIDE 9 MG/ML
1000 INJECTION, SOLUTION INTRAMUSCULAR; INTRAVENOUS; SUBCUTANEOUS ONCE
Refills: 0 | Status: COMPLETED | OUTPATIENT
Start: 2024-11-17 | End: 2024-11-17

## 2024-11-17 RX ORDER — 0.9 % SODIUM CHLORIDE 0.9 %
1000 INTRAVENOUS SOLUTION INTRAVENOUS
Refills: 0 | Status: DISCONTINUED | OUTPATIENT
Start: 2024-11-17 | End: 2024-11-19

## 2024-11-17 RX ORDER — GABAPENTIN 300 MG/1
1 CAPSULE ORAL
Refills: 0 | DISCHARGE

## 2024-11-17 RX ORDER — ACETAMINOPHEN, DIPHENHYDRAMINE HCL, PHENYLEPHRINE HCL 325; 25; 5 MG/1; MG/1; MG/1
3 TABLET ORAL AT BEDTIME
Refills: 0 | Status: DISCONTINUED | OUTPATIENT
Start: 2024-11-17 | End: 2024-11-19

## 2024-11-17 RX ORDER — MAGNESIUM, ALUMINUM HYDROXIDE 200-225/5
30 SUSPENSION, ORAL (FINAL DOSE FORM) ORAL EVERY 4 HOURS
Refills: 0 | Status: DISCONTINUED | OUTPATIENT
Start: 2024-11-17 | End: 2024-11-19

## 2024-11-17 RX ORDER — TETRACAINE HYDROCHLORIDE 5 MG/ML
1 SOLUTION OPHTHALMIC ONCE
Refills: 0 | Status: COMPLETED | OUTPATIENT
Start: 2024-11-17 | End: 2024-11-17

## 2024-11-17 RX ORDER — CHLORHEXIDINE GLUCONATE 1.2 MG/ML
1 RINSE ORAL
Refills: 0 | Status: DISCONTINUED | OUTPATIENT
Start: 2024-11-17 | End: 2024-11-19

## 2024-11-17 RX ORDER — ACYCLOVIR 200 MG
800 CAPSULE ORAL EVERY 8 HOURS
Refills: 0 | Status: DISCONTINUED | OUTPATIENT
Start: 2024-11-17 | End: 2024-11-19

## 2024-11-17 RX ORDER — KETOROLAC TROMETHAMINE 30 MG/ML
15 INJECTION INTRAMUSCULAR; INTRAVENOUS
Refills: 0 | Status: DISCONTINUED | OUTPATIENT
Start: 2024-11-17 | End: 2024-11-19

## 2024-11-17 RX ORDER — ONDANSETRON HYDROCHLORIDE 4 MG/1
4 TABLET, FILM COATED ORAL EVERY 8 HOURS
Refills: 0 | Status: DISCONTINUED | OUTPATIENT
Start: 2024-11-17 | End: 2024-11-19

## 2024-11-17 RX ORDER — ENOXAPARIN SODIUM 30 MG/.3ML
40 INJECTION SUBCUTANEOUS EVERY 24 HOURS
Refills: 0 | Status: DISCONTINUED | OUTPATIENT
Start: 2024-11-17 | End: 2024-11-19

## 2024-11-17 RX ORDER — ACETAMINOPHEN 500MG 500 MG/1
650 TABLET, COATED ORAL EVERY 6 HOURS
Refills: 0 | Status: DISCONTINUED | OUTPATIENT
Start: 2024-11-17 | End: 2024-11-19

## 2024-11-17 RX ORDER — KETOROLAC TROMETHAMINE 30 MG/ML
15 INJECTION INTRAMUSCULAR; INTRAVENOUS ONCE
Refills: 0 | Status: DISCONTINUED | OUTPATIENT
Start: 2024-11-17 | End: 2024-11-17

## 2024-11-17 RX ORDER — ACYCLOVIR 200 MG
800 CAPSULE ORAL ONCE
Refills: 0 | Status: COMPLETED | OUTPATIENT
Start: 2024-11-17 | End: 2024-11-17

## 2024-11-17 RX ORDER — FLUORESCEIN SODIUM 100 %
2 POWDER (GRAM) MISCELLANEOUS ONCE
Refills: 0 | Status: COMPLETED | OUTPATIENT
Start: 2024-11-17 | End: 2024-11-17

## 2024-11-17 RX ADMIN — TETRACAINE HYDROCHLORIDE 1 DROP(S): 5 SOLUTION OPHTHALMIC at 10:07

## 2024-11-17 RX ADMIN — KETOROLAC TROMETHAMINE 15 MILLIGRAM(S): 30 INJECTION INTRAMUSCULAR; INTRAVENOUS at 20:56

## 2024-11-17 RX ADMIN — Medication 75 MILLILITER(S): at 20:57

## 2024-11-17 RX ADMIN — SODIUM CHLORIDE 2000 MILLILITER(S): 9 INJECTION, SOLUTION INTRAMUSCULAR; INTRAVENOUS; SUBCUTANEOUS at 10:37

## 2024-11-17 RX ADMIN — Medication 2 APPLICATION(S): at 10:06

## 2024-11-17 RX ADMIN — KETOROLAC TROMETHAMINE 15 MILLIGRAM(S): 30 INJECTION INTRAMUSCULAR; INTRAVENOUS at 10:37

## 2024-11-17 RX ADMIN — Medication 266 MILLIGRAM(S): at 11:43

## 2024-11-17 RX ADMIN — Medication 266 MILLIGRAM(S): at 19:00

## 2024-11-17 RX ADMIN — ENOXAPARIN SODIUM 40 MILLIGRAM(S): 30 INJECTION SUBCUTANEOUS at 13:43

## 2024-11-17 RX ADMIN — Medication 75 MILLILITER(S): at 13:43

## 2024-11-17 NOTE — H&P ADULT - NSHPLABSRESULTS_GEN_ALL_CORE
16.1   6.08  )-----------( 132      ( 17 Nov 2024 10:39 )             47.9       11-17    140  |  102  |  11  ----------------------------<  190[H]  3.5   |  27  |  0.9    Ca    8.6      17 Nov 2024 10:39    TPro  6.8  /  Alb  4.2  /  TBili  1.2  /  DBili  x   /  AST  23  /  ALT  28  /  AlkPhos  65  11-17              Urinalysis Basic - ( 17 Nov 2024 10:39 )    Color: x / Appearance: x / SG: x / pH: x  Gluc: 190 mg/dL / Ketone: x  / Bili: x / Urobili: x   Blood: x / Protein: x / Nitrite: x   Leuk Esterase: x / RBC: x / WBC x   Sq Epi: x / Non Sq Epi: x / Bacteria: x

## 2024-11-17 NOTE — CONSULT NOTE ADULT - SUBJECTIVE AND OBJECTIVE BOX
ESA MORALES  59y, Male  Allergies    No Known Allergies    Intolerances    LOS      HPI  HPI:  Patient is a 60yo M with no significant pmhx whom brought to ed by his daughter in law whom is translating for him as per his request with c/o rash to the face for 3 days described as painful, causing moderate to severe pain, associated with swelling, and erythema. Pt denies associated fevers, chills, chest pain, sob, change in vision, or confusion. Pt denies hx of similar rash in the past or sick contacts. Pt started on acyclovir in ED for herpes zoster infection as per ID attending. Pt given toradol for pain.  (17 Nov 2024 12:28)      INFECTIOUS DISEASE HISTORY:  Hospital course-  ID consulted for antimicrobial recommendations.     Prior hospital charts reviewed [Yes]  Primary team notes reviewed [Yes]  Other consultant notes reviewed [Yes]    REVIEW OF SYSTEMS:  CONSTITUTIONAL: No fever or chills  HEENT: No sore throat  RESPIRATORY: No cough, no shortness of breath  CARDIOVASCULAR: No chest pain or palpitations  GASTROINTESTINAL: No abdominal or epigastric pain  GENITOURINARY: No dysuria  NEUROLOGICAL: No headache/dizziness  MSK: No joint pain, erythema, or swelling; no back pain  SKIN: No itching, rashes  All other ROS negative except noted above    PAST MEDICAL & SURGICAL HISTORY:  Shingles      No significant past surgical history    SOCIAL HISTORY:  - No recent travel    FAMILY HISTORY:  No pertinent family history in first degree relatives    ANTIMICROBIALS:  acyclovir IVPB 800 every 8 hours      ANTIMICROBIALS (past 90 days):  MEDICATIONS  (STANDING):    acyclovir IVPB   266 mL/Hr IV Intermittent (11-17-24 @ 11:43)        OTHER MEDS:   MEDICATIONS  (STANDING):  acetaminophen     Tablet .. 650 every 6 hours PRN  aluminum hydroxide/magnesium hydroxide/simethicone Suspension 30 every 4 hours PRN  enoxaparin Injectable 40 every 24 hours  ketorolac   Injectable 15 four times a day PRN  melatonin 3 at bedtime PRN  ondansetron Injectable 4 every 8 hours PRN      VITALS:  Vital Signs Last 24 Hrs  T(F): 97.8 (11-17-24 @ 09:52), Max: 97.8 (11-17-24 @ 09:52)    Vital Signs Last 24 Hrs  HR: 85 (11-17-24 @ 09:52) (85 - 85)  BP: 136/84 (11-17-24 @ 09:52) (136/84 - 136/84)  RR: 16 (11-17-24 @ 09:52)  SpO2: 95% (11-17-24 @ 09:52) (95% - 95%)  Wt(kg): --    EXAM:  GENERAL: Obese. Mild distress. Mandarin speaking.   HEAD: Painful swelling on the face with some vesicular lesions noted on the left face. Multiple dermatomes. Some vesicles noted inside the buccal area on the left.   NECK: Supple  CHEST/LUNG: Clear to auscultation bilaterally  HEART: S1 S2  ABDOMEN: Soft, nontender  EXTREMITIES: No clubbing  NERVOUS SYSTEM: Alert and oriented to person  MSK: No joint erythema, swelling or pain  SKIN: No rashes or lesions, no superficial thrombophlebitis    Labs:                        16.1   6.08  )-----------( 132      ( 17 Nov 2024 10:39 )             47.9     11-17    140  |  102  |  11  ----------------------------<  190[H]  3.5   |  27  |  0.9    Ca    8.6      17 Nov 2024 10:39    TPro  6.8  /  Alb  4.2  /  TBili  1.2  /  DBili  x   /  AST  23  /  ALT  28  /  AlkPhos  65  11-17      WBC Trend:  WBC Count: 6.08 (11-17-24 @ 10:39)      Auto Neutrophil #: 4.13 K/uL (11-17-24 @ 10:39)      Creatine Trend:  Creatinine: 0.9 (11-17)      Liver Biochemical Testing Trend:  Alanine Aminotransferase (ALT/SGPT): 28 (11-17)  Aspartate Aminotransferase (AST/SGOT): 23 (11-17-24 @ 10:39)  Bilirubin Total: 1.2 (11-17)      Trend LDH      Auto Eosinophil %: 1.0 % (11-17-24 @ 10:39)      Urinalysis Basic - ( 17 Nov 2024 10:39 )    Color: x / Appearance: x / SG: x / pH: x  Gluc: 190 mg/dL / Ketone: x  / Bili: x / Urobili: x   Blood: x / Protein: x / Nitrite: x   Leuk Esterase: x / RBC: x / WBC x   Sq Epi: x / Non Sq Epi: x / Bacteria: x        MICROBIOLOGY:    Male              INFLAMMATORY MARKERS      RADIOLOGY & ADDITIONAL TESTS:  I have personally reviewed the imagings.  CXR      CT      CARDIOLOGY TESTING

## 2024-11-17 NOTE — ED PROVIDER NOTE - OBJECTIVE STATEMENT
59 year old male past medical history od Diabetes, HLD presenting for painfaicl facial rash x3 days. Per patient the rash began 3 days ago and w 59 year old male past medical history od Diabetes, HLD presenting for painful facial rash x3 days. Per patient the rash began 3 days ago and has become increasingly painful with blisters in mouth and ear. Patient was seen at a clinic yesterday and started on PO antivirals. Patient presenting today for worsening of symptoms and difficulty tolerating PO 59 year old male past medical history od Diabetes, HLD presenting for painful facial rash x3 days. Per patient the rash began 3 days ago and has become increasingly painful with blisters in mouth and ear. Patient was seen at a clinic yesterday and started on PO antivirals. Patient presenting today for worsening of symptoms and difficulty tolerating PO. 59 year old male past medical history od Diabetes, HLD presenting for painful facial rash x3 days. Per patient the rash began 3 days ago and has become increasingly painful with blisters in mouth and ear. Patient was seen at a clinic yesterday and started on PO antivirals. Patient presenting today for worsening of symptoms and difficulty tolerating PO. Patient denies any recent,  fever, chills, nausea, vomiting, or vision changes

## 2024-11-17 NOTE — H&P ADULT - HISTORY OF PRESENT ILLNESS
Patient is a 60yo M with no significant pmhx whom brought to ed by his daughter in law whom is translating for him as per his request with c/o rash to the face for 3 days described as painful, causing moderate to severe pain, associated with swelling, and erythema. Pt denies associated fevers, chills, chest pain, sob, change in vision, or confusion. Pt denies hx of similar rash in the past or sick contacts. Pt started on acyclovir in ED for herpes zoster infection as per ID attending. Pt given toradol for pain.

## 2024-11-17 NOTE — PATIENT PROFILE ADULT - FALL HARM RISK - HARM RISK INTERVENTIONS

## 2024-11-17 NOTE — ED PROVIDER NOTE - PHYSICAL EXAMINATION
VITAL SIGNS: I have reviewed nursing notes and confirm.  CONSTITUTIONAL: Well-developed; well-nourished; in no acute distress.  SKIN: Skin exam is warm and dry, no acute rash.  HEAD: L facial edema, small blisters/ vesicles, consistent with shingles.   EYES: Fluorescein stain: no dendrites.  EOM intact; conjunctiva and sclera clear.  ENT: Vesicles noted in Left ear  NECK: Supple  CARD: S1, S2 normal; no murmurs, gallops, or rubs. Regular rate and rhythm.  RESP: CTA Speaking in full sentences.   EXT: Normal ROM  NEURO: Alert, oriented. Grossly unremarkable. No focal deficits.   PSYCH: Cooperative, appropriate.

## 2024-11-17 NOTE — H&P ADULT - NSHPPHYSICALEXAM_GEN_ALL_CORE
Vital Signs Last 24 Hrs  T(C): 36.6 (17 Nov 2024 09:52), Max: 36.6 (17 Nov 2024 09:52)  T(F): 97.8 (17 Nov 2024 09:52), Max: 97.8 (17 Nov 2024 09:52)  HR: 85 (17 Nov 2024 09:52) (85 - 85)  BP: 136/84 (17 Nov 2024 09:52) (136/84 - 136/84)  BP(mean): --  RR: 16 (17 Nov 2024 09:52) (16 - 16)  SpO2: 95% (17 Nov 2024 09:52) (95% - 95%)    PHYSICAL EXAM:      Constitutional: A&Ox4, NAD  Respiratory: cta b/l  Cardiovascular: s1 s2 rrr  Gastrointestinal: soft nt  nd + bs no rebound or guarding  Genitourinary: no cva tenderness  Extremities: normal rom, no edema, calf tenderness  Neurological:no focal deficits  Skin: erythema, swelling, blistering lesiosn to left face

## 2024-11-17 NOTE — H&P ADULT - ASSESSMENT
Patient is a 60yo M with no significant pmhx whom brought to ed by his daughter in law whom is translating for him as per his request with c/o rash to the face for 3 days described as painful, causing moderate to severe pain, associated with swelling, and erythema. Pt denies associated fevers, chills, chest pain, sob, change in vision, or confusion. Pt denies hx of similar rash in the past or sick contacts. Pt started on acyclovir in ED for herpes zoster infection as per ID attending. Pt given toradol for pain.     #herpes zoster infection of face  #dehydration  -isolation  -ID consult- discussed case, requesting HSV and varicella pcr swab  -c/w acyclovir  -IVF  -toradol and tylneol prn pain  -chg bath  -dvt prophylaxis scd and lovenox  -d/w Dr Morales

## 2024-11-17 NOTE — ED ADULT TRIAGE NOTE - WEIGHT IN KG
[Dear  ___] : Dear  [unfilled], [Consult Letter:] : I had the pleasure of evaluating your patient, [unfilled]. [Please see my note below.] : Please see my note below. [Consult Closing:] : Thank you very much for allowing me to participate in the care of this patient.  If you have any questions, please do not hesitate to contact me. [Sincerely,] : Sincerely, [DrIvan  ___] : Dr. HARRIS [FreeTextEntry3] : Romain Vega MD, FCCP, D. ABSM\par  Pulmonary and Sleep Medicine\par  [facility] at [place]  86.2

## 2024-11-17 NOTE — ED ADULT TRIAGE NOTE - CHIEF COMPLAINT QUOTE
Pt arrives with daughter in law interpreting. "He has a rash to his face that started three days ago. His primary doctor prescribed medication but it is getting worse." Pt was prescribed Acyclovir, Prednisone 20 mg, Gabapentin 100 mg, Bacitracin and Docosanol.

## 2024-11-17 NOTE — ED ADULT NURSE NOTE - NS_SISCREENINGSR_GEN_ALL_ED
Blanka from [facility] stated she will speak with Taihr Taveras, head of PT to find out more information with comprehensive spine and pain  She did stated first available would be next week  Will find out more information and to call us and patient directly  Negative

## 2024-11-17 NOTE — H&P ADULT - NS ATTEND AMEND GEN_ALL_CORE FT
58yo M with no significant pmhx whom brought to ed by his daughter in law whom is translating for him as per his request with c/o rash to the face for 3 days described as painful, causing moderate to severe pain, associated with swelling, and erythema. Pt denies associated fevers, chills, chest pain, sob, change in vision, or confusion. Pt denies hx of similar rash in the past or sick contacts. Pt started on acyclovir in ED for herpes zoster infection as per ID attending. Pt given toradol for pain.     #Disseminated herpes zoster infection  - Facial, mucosal involvment, no occular involvment  -airborne isolation  -ID consult- discussed case, requesting HSV and varicella pcr swab  -c/w acyclovir  -IVF  - HIV Antigen/ Ab  -toradol and tylneol prn pain  -chg bath  - Would recommend Shingles vaccine OP in several months after resolution  -dvt prophylaxis scd and lovenox

## 2024-11-17 NOTE — ED PROVIDER NOTE - CLINICAL SUMMARY MEDICAL DECISION MAKING FREE TEXT BOX
Patient is a 59-year-old male presents for evaluation of rash onset over the past 3 to 4 days past medical history of diabetes hypertension hyperlipidemia states that he has developed painful rash denies any visual changes but notes eye irritation in addition there is oral involvement decreased p.o. 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 in addition no Abdul sign patient has no ocular involvement at this point no uptake on floral stain to suggest an dendritic pattern oropharynx patient also has ulcerations that are painful of the tongue as well as the underside of the tongue chest clear to bilaterally abdomen soft nontender nondistended bowel sounds positive no guarding rebound extremities full range of motion no focal deficits noted radial pulses 2+ pedal pulse 2+    Assessment plan given this patient's multi dermatomal involvement concern for potential disseminated shingles no Abdul sign with the patient does have evidence of Sharon Bradford given IV acyclovir we consulted ID who advised admission I will continue to monitor at this time family updated while conversations held via  services

## 2024-11-17 NOTE — ED PROVIDER NOTE - ATTENDING APP SHARED VISIT CONTRIBUTION OF CARE
I have personally performed a history and physical exam on this patient and personally directed the management of the patient. Patient is a 59-year-old male presents for evaluation of rash onset over the past 3 to 4 days past medical history of diabetes hypertension hyperlipidemia states that he has developed painful rash denies any visual changes but notes eye irritation in addition there is oral involvement decreased p.o. secondary to pain    On physical exam patient has evidence of blistering rash in the left upper facial region with blisters formed in the ER constituting Holmdel Bradford syndrome in addition no Abdul sign patient has no ocular involvement at this point no uptake on floral stain to suggest an dendritic pattern oropharynx patient also has ulcerations that are painful of the tongue as well as the underside of the tongue chest clear to bilaterally abdomen soft nontender nondistended bowel sounds positive no guarding rebound extremities full range of motion no focal deficits noted radial pulses 2+ pedal pulse 2+    Assessment plan given this patient's multi dermatomal involvement concern for potential disseminated shingles no Abdul sign with the patient does have evidence of Dalton Bradford given IV acyclovir we consulted ID who advised admission I will continue to monitor at this time family updated while conversations held via  services

## 2024-11-18 LAB
A1C WITH ESTIMATED AVERAGE GLUCOSE RESULT: 5.8 % — HIGH (ref 4–5.6)
ANION GAP SERPL CALC-SCNC: 9 MMOL/L — SIGNIFICANT CHANGE UP (ref 7–14)
BASOPHILS # BLD AUTO: 0 K/UL — SIGNIFICANT CHANGE UP (ref 0–0.2)
BASOPHILS NFR BLD AUTO: 0 % — SIGNIFICANT CHANGE UP (ref 0–1)
BUN SERPL-MCNC: 10 MG/DL — SIGNIFICANT CHANGE UP (ref 10–20)
CALCIUM SERPL-MCNC: 8.7 MG/DL — SIGNIFICANT CHANGE UP (ref 8.4–10.5)
CHLORIDE SERPL-SCNC: 102 MMOL/L — SIGNIFICANT CHANGE UP (ref 98–110)
CO2 SERPL-SCNC: 31 MMOL/L — SIGNIFICANT CHANGE UP (ref 17–32)
CREAT SERPL-MCNC: 0.9 MG/DL — SIGNIFICANT CHANGE UP (ref 0.7–1.5)
DACRYOCYTES BLD QL SMEAR: SLIGHT — SIGNIFICANT CHANGE UP
EGFR: 97 ML/MIN/1.73M2 — SIGNIFICANT CHANGE UP
EOSINOPHIL # BLD AUTO: 0.1 K/UL — SIGNIFICANT CHANGE UP (ref 0–0.7)
EOSINOPHIL NFR BLD AUTO: 2 % — SIGNIFICANT CHANGE UP (ref 0–8)
ESTIMATED AVERAGE GLUCOSE: 120 MG/DL — HIGH (ref 68–114)
GLUCOSE SERPL-MCNC: 112 MG/DL — HIGH (ref 70–99)
HCT VFR BLD CALC: 49.2 % — SIGNIFICANT CHANGE UP (ref 42–52)
HGB BLD-MCNC: 16.6 G/DL — SIGNIFICANT CHANGE UP (ref 14–18)
HIV 1+2 AB+HIV1 P24 AG SERPL QL IA: SIGNIFICANT CHANGE UP
HSV+VZV DNA SPEC QL NAA+PROBE: ABNORMAL
LYMPHOCYTES # BLD AUTO: 1.35 K/UL — SIGNIFICANT CHANGE UP (ref 1.2–3.4)
LYMPHOCYTES # BLD AUTO: 26 % — SIGNIFICANT CHANGE UP (ref 20.5–51.1)
MANUAL SMEAR VERIFICATION: SIGNIFICANT CHANGE UP
MCHC RBC-ENTMCNC: 30.7 PG — SIGNIFICANT CHANGE UP (ref 27–31)
MCHC RBC-ENTMCNC: 33.7 G/DL — SIGNIFICANT CHANGE UP (ref 32–37)
MCV RBC AUTO: 90.9 FL — SIGNIFICANT CHANGE UP (ref 80–94)
MONOCYTES # BLD AUTO: 0.73 K/UL — HIGH (ref 0.1–0.6)
MONOCYTES NFR BLD AUTO: 14 % — HIGH (ref 1.7–9.3)
NEUTROPHILS # BLD AUTO: 2.8 K/UL — SIGNIFICANT CHANGE UP (ref 1.4–6.5)
NEUTROPHILS NFR BLD AUTO: 51 % — SIGNIFICANT CHANGE UP (ref 42.2–75.2)
NEUTS BAND # BLD: 3 % — SIGNIFICANT CHANGE UP (ref 0–6)
NRBC # BLD: 0 /100 WBCS — SIGNIFICANT CHANGE UP (ref 0–0)
NRBC # BLD: SIGNIFICANT CHANGE UP /100 WBCS (ref 0–0)
PLAT MORPH BLD: NORMAL — SIGNIFICANT CHANGE UP
PLATELET # BLD AUTO: 142 K/UL — SIGNIFICANT CHANGE UP (ref 130–400)
PMV BLD: 10.2 FL — SIGNIFICANT CHANGE UP (ref 7.4–10.4)
POTASSIUM SERPL-MCNC: 3.8 MMOL/L — SIGNIFICANT CHANGE UP (ref 3.5–5)
POTASSIUM SERPL-SCNC: 3.8 MMOL/L — SIGNIFICANT CHANGE UP (ref 3.5–5)
RBC # BLD: 5.41 M/UL — SIGNIFICANT CHANGE UP (ref 4.7–6.1)
RBC # FLD: 12.3 % — SIGNIFICANT CHANGE UP (ref 11.5–14.5)
RBC BLD AUTO: ABNORMAL
SODIUM SERPL-SCNC: 142 MMOL/L — SIGNIFICANT CHANGE UP (ref 135–146)
SPECIMEN SOURCE: SIGNIFICANT CHANGE UP
VARIANT LYMPHS # BLD: 4 % — SIGNIFICANT CHANGE UP (ref 0–5)
WBC # BLD: 5.19 K/UL — SIGNIFICANT CHANGE UP (ref 4.8–10.8)
WBC # FLD AUTO: 5.19 K/UL — SIGNIFICANT CHANGE UP (ref 4.8–10.8)

## 2024-11-18 PROCEDURE — 99232 SBSQ HOSP IP/OBS MODERATE 35: CPT

## 2024-11-18 RX ADMIN — Medication 266 MILLIGRAM(S): at 18:25

## 2024-11-18 RX ADMIN — Medication 266 MILLIGRAM(S): at 03:04

## 2024-11-18 RX ADMIN — ENOXAPARIN SODIUM 40 MILLIGRAM(S): 30 INJECTION SUBCUTANEOUS at 11:48

## 2024-11-18 RX ADMIN — Medication 266 MILLIGRAM(S): at 11:48

## 2024-11-18 NOTE — PROGRESS NOTE ADULT - SUBJECTIVE AND OBJECTIVE BOX
MORALES ESA  59y, Male    LOS  1d    INTERVAL EVENTS/HPI  - No acute events overnight  - T(F): , Max: 98.6 (11-17-24 @ 20:31)  - Denies any worsening symptoms  - Tolerating medication  - WBC Count: 5.19 (11-18-24 @ 05:30)  WBC Count: 6.08 (11-17-24 @ 10:39)  - Creatinine: 0.9 (11-17-24 @ 10:39)    REVIEW OF SYSTEMS:  CONSTITUTIONAL: No fever or chills  HEENT: No sore throat  RESPIRATORY: No cough, no shortness of breath  CARDIOVASCULAR: No chest pain or palpitations  GASTROINTESTINAL: No abdominal or epigastric pain  GENITOURINARY: No dysuria  NEUROLOGICAL: No headache/dizziness  MSK: No joint pain, erythema, or swelling; no back pain  SKIN: No itching, rashes  All other ROS negative except noted above    Prior hospital charts reviewed [Yes]  Primary team notes reviewed [Yes]  Other consultant notes reviewed [Yes]    ANTIMICROBIALS:   acyclovir IVPB 800 every 8 hours      OTHER MEDS: MEDICATIONS  (STANDING):  acetaminophen     Tablet .. 650 every 6 hours PRN  aluminum hydroxide/magnesium hydroxide/simethicone Suspension 30 every 4 hours PRN  enoxaparin Injectable 40 every 24 hours  ketorolac   Injectable 15 four times a day PRN  melatonin 3 at bedtime PRN  ondansetron Injectable 4 every 8 hours PRN      Vital Signs Last 24 Hrs  T(F): 97.8 (11-18-24 @ 04:40), Max: 98.6 (11-17-24 @ 20:31)    Vital Signs Last 24 Hrs  HR: 75 (11-18-24 @ 04:40) (64 - 92)  BP: 145/77 (11-18-24 @ 04:40) (113/69 - 146/78)  RR: 18 (11-18-24 @ 04:40)  SpO2: 94% (11-18-24 @ 04:40) (94% - 98%)  Wt(kg): --    EXAM:  GENERAL: Obese. Mandarin speaking.   HEAD: Painful swelling on the face with some vesicular lesions noted on the left face. Multiple dermatomes. Some vesicles noted inside the buccal area on the left. Gradually crusting.   NECK: Supple  CHEST/LUNG: Clear to auscultation bilaterally  HEART: S1 S2  ABDOMEN: Soft, nontender  EXTREMITIES: No clubbing  NERVOUS SYSTEM: Alert and oriented to person  MSK: No joint erythema, swelling or pain  SKIN: No rashes or lesions, no superficial thrombophlebitis    Labs:                        16.6   5.19  )-----------( 142      ( 18 Nov 2024 05:30 )             49.2     11-17    140  |  102  |  11  ----------------------------<  190[H]  3.5   |  27  |  0.9    Ca    8.6      17 Nov 2024 10:39    TPro  6.8  /  Alb  4.2  /  TBili  1.2  /  DBili  x   /  AST  23  /  ALT  28  /  AlkPhos  65  11-17      WBC Trend:  WBC Count: 5.19 (11-18-24 @ 05:30)  WBC Count: 6.08 (11-17-24 @ 10:39)      Creatine Trend:  Creatinine: 0.9 (11-17)      Liver Biochemical Testing Trend:  Alanine Aminotransferase (ALT/SGPT): 28 (11-17)  Aspartate Aminotransferase (AST/SGOT): 23 (11-17-24 @ 10:39)  Bilirubin Total: 1.2 (11-17)      Trend LDH      Urinalysis Basic - ( 17 Nov 2024 10:39 )    Color: x / Appearance: x / SG: x / pH: x  Gluc: 190 mg/dL / Ketone: x  / Bili: x / Urobili: x   Blood: x / Protein: x / Nitrite: x   Leuk Esterase: x / RBC: x / WBC x   Sq Epi: x / Non Sq Epi: x / Bacteria: x        MICROBIOLOGY:    Male    RADIOLOGY & ADDITIONAL TESTS:  I have personally reviewed the relevant images.   CXR      CT        WEIGHT  Weight (kg): 84.4 (11-17-24 @ 16:32)  Creatinine: 0.9 mg/dL (11-17-24 @ 10:39)      All available historical records have been reviewed

## 2024-11-18 NOTE — PROGRESS NOTE ADULT - ASSESSMENT
59 year old male with no pertinent PMH of presenting for facial rash.     IMPRESSION  #Shingles-Possibility of disseminated shingles due to several dermatomal involvements.   #Obesity     RECOMMENDATIONS  -Follow up with HSV/VZV swab and HIV screen.  -For now keep on airborne isolation.  -IV acyclovir 10mg/kg q 8 hours. Gentle hydration to avoid crystallization. (S/D 11/17)  -Will benefit from shingrix vaccines in 1-2 months outpatient.    If any questions, please send a message or call on [email or web address] Teams  Please continue to update ID with any pertinent new laboratory or radiographic findings.    Jimena Edge M.D  Infectious Diseases Attending/   [employer] at [facility]     59 year old male with no pertinent PMH of presenting for facial rash.     IMPRESSION  #Shingles-Possibility of disseminated shingles due to several dermatomal involvements.   #Obesity     RECOMMENDATIONS  -Follow up with HIV screen.  -VZV PCR positive.   -For now keep on airborne isolation.  -IV acyclovir 10mg/kg q 8 hours. Gentle hydration to avoid crystallization. (S/D 11/17)  -Will benefit from shingrix vaccines in 1-2 months outpatient.    If any questions, please send a message or call on [employer] Teams  Please continue to update ID with any pertinent new laboratory or radiographic findings.    Jimena Edge M.D  Infectious Diseases Attending/   [employer] at [facility]

## 2024-11-18 NOTE — PROGRESS NOTE ADULT - SUBJECTIVE AND OBJECTIVE BOX
ANDREW ESA  59y, Male    LOS  1d    INTERVAL EVENTS/HPI  - No acute events overnight  - T(F): , Max: 98.6 (11-17-24 @ 20:31)  - Denies any worsening symptoms  - Tolerating medication  - WBC Count: 5.19 (11-18-24 @ 05:30)  WBC Count: 6.08 (11-17-24 @ 10:39)  - Creatinine: 0.9 (11-17-24 @ 10:39)    REVIEW OF SYSTEMS:  CONSTITUTIONAL: No fever or chills  HEENT: No sore throat  RESPIRATORY: No cough, no shortness of breath  CARDIOVASCULAR: No chest pain or palpitations  GASTROINTESTINAL: No abdominal or epigastric pain  GENITOURINARY: No dysuria  NEUROLOGICAL: No headache/dizziness  MSK: No joint pain, erythema, or swelling; no back pain  SKIN: No itching, rashes  All other ROS negative except noted above    Prior hospital charts reviewed [Yes]  Primary team notes reviewed [Yes]  Other consultant notes reviewed [Yes]    ANTIMICROBIALS:   acyclovir IVPB 800 every 8 hours      OTHER MEDS: MEDICATIONS  (STANDING):  acetaminophen     Tablet .. 650 every 6 hours PRN  aluminum hydroxide/magnesium hydroxide/simethicone Suspension 30 every 4 hours PRN  enoxaparin Injectable 40 every 24 hours  ketorolac   Injectable 15 four times a day PRN  melatonin 3 at bedtime PRN  ondansetron Injectable 4 every 8 hours PRN      Vital Signs Last 24 Hrs  T(F): 97.8 (11-18-24 @ 04:40), Max: 98.6 (11-17-24 @ 20:31)    Vital Signs Last 24 Hrs  HR: 75 (11-18-24 @ 04:40) (64 - 92)  BP: 145/77 (11-18-24 @ 04:40) (113/69 - 146/78)  RR: 18 (11-18-24 @ 04:40)  SpO2: 94% (11-18-24 @ 04:40) (94% - 98%)  Wt(kg): --    EXAM:  GENERAL: Obese. Mandarin speaking.   HEAD: Painful swelling on the face with some vesicular lesions noted on the left face. Multiple dermatomes. Some vesicles noted inside the buccal area on the left.   NECK: Supple  CHEST/LUNG: Clear to auscultation bilaterally  HEART: S1 S2  ABDOMEN: Soft, nontender  EXTREMITIES: No clubbing  NERVOUS SYSTEM: Alert and oriented to person  MSK: No joint erythema, swelling or pain  SKIN: No rashes or lesions, no superficial thrombophlebitis    Labs:                        16.6   5.19  )-----------( 142      ( 18 Nov 2024 05:30 )             49.2     11-17    140  |  102  |  11  ----------------------------<  190[H]  3.5   |  27  |  0.9    Ca    8.6      17 Nov 2024 10:39    TPro  6.8  /  Alb  4.2  /  TBili  1.2  /  DBili  x   /  AST  23  /  ALT  28  /  AlkPhos  65  11-17      WBC Trend:  WBC Count: 5.19 (11-18-24 @ 05:30)  WBC Count: 6.08 (11-17-24 @ 10:39)      Creatine Trend:  Creatinine: 0.9 (11-17)      Liver Biochemical Testing Trend:  Alanine Aminotransferase (ALT/SGPT): 28 (11-17)  Aspartate Aminotransferase (AST/SGOT): 23 (11-17-24 @ 10:39)  Bilirubin Total: 1.2 (11-17)      Trend LDH      Urinalysis Basic - ( 17 Nov 2024 10:39 )    Color: x / Appearance: x / SG: x / pH: x  Gluc: 190 mg/dL / Ketone: x  / Bili: x / Urobili: x   Blood: x / Protein: x / Nitrite: x   Leuk Esterase: x / RBC: x / WBC x   Sq Epi: x / Non Sq Epi: x / Bacteria: x        MICROBIOLOGY:    Male    RADIOLOGY & ADDITIONAL TESTS:  I have personally reviewed the relevant images.   CXR      CT        WEIGHT  Weight (kg): 84.4 (11-17-24 @ 16:32)  Creatinine: 0.9 mg/dL (11-17-24 @ 10:39)      All available historical records have been reviewed       MORALES ESA  59y, Male    LOS  1d    INTERVAL EVENTS/HPI  - No acute events overnight  - T(F): , Max: 98.6 (11-17-24 @ 20:31)  - Denies any worsening symptoms  - Tolerating medication  - WBC Count: 5.19 (11-18-24 @ 05:30)  WBC Count: 6.08 (11-17-24 @ 10:39)  - Creatinine: 0.9 (11-17-24 @ 10:39)    REVIEW OF SYSTEMS:  CONSTITUTIONAL: No fever or chills  HEENT: No sore throat  RESPIRATORY: No cough, no shortness of breath  CARDIOVASCULAR: No chest pain or palpitations  GASTROINTESTINAL: No abdominal or epigastric pain  GENITOURINARY: No dysuria  NEUROLOGICAL: No headache/dizziness  MSK: No joint pain, erythema, or swelling; no back pain  SKIN: No itching, rashes  All other ROS negative except noted above    Prior hospital charts reviewed [Yes]  Primary team notes reviewed [Yes]  Other consultant notes reviewed [Yes]    ANTIMICROBIALS:   acyclovir IVPB 800 every 8 hours      OTHER MEDS: MEDICATIONS  (STANDING):  acetaminophen     Tablet .. 650 every 6 hours PRN  aluminum hydroxide/magnesium hydroxide/simethicone Suspension 30 every 4 hours PRN  enoxaparin Injectable 40 every 24 hours  ketorolac   Injectable 15 four times a day PRN  melatonin 3 at bedtime PRN  ondansetron Injectable 4 every 8 hours PRN      Vital Signs Last 24 Hrs  T(F): 97.8 (11-18-24 @ 04:40), Max: 98.6 (11-17-24 @ 20:31)    Vital Signs Last 24 Hrs  HR: 75 (11-18-24 @ 04:40) (64 - 92)  BP: 145/77 (11-18-24 @ 04:40) (113/69 - 146/78)  RR: 18 (11-18-24 @ 04:40)  SpO2: 94% (11-18-24 @ 04:40) (94% - 98%)  Wt(kg): --    EXAM:  GENERAL: Obese. Mandarin speaking.   HEAD: Painful swelling on the face with some vesicular lesions noted on the left face. Multiple dermatomes. Some vesicles noted inside the buccal area on the left. Gradually crusting.   NECK: Supple  CHEST/LUNG: Clear to auscultation bilaterally  HEART: S1 S2  ABDOMEN: Soft, nontender  EXTREMITIES: No clubbing  NERVOUS SYSTEM: Alert and oriented to person  MSK: No joint erythema, swelling or pain  SKIN: No rashes or lesions, no superficial thrombophlebitis    Labs:                        16.6   5.19  )-----------( 142      ( 18 Nov 2024 05:30 )             49.2     11-17    140  |  102  |  11  ----------------------------<  190[H]  3.5   |  27  |  0.9    Ca    8.6      17 Nov 2024 10:39    TPro  6.8  /  Alb  4.2  /  TBili  1.2  /  DBili  x   /  AST  23  /  ALT  28  /  AlkPhos  65  11-17      WBC Trend:  WBC Count: 5.19 (11-18-24 @ 05:30)  WBC Count: 6.08 (11-17-24 @ 10:39)      Creatine Trend:  Creatinine: 0.9 (11-17)      Liver Biochemical Testing Trend:  Alanine Aminotransferase (ALT/SGPT): 28 (11-17)  Aspartate Aminotransferase (AST/SGOT): 23 (11-17-24 @ 10:39)  Bilirubin Total: 1.2 (11-17)      Trend LDH      Urinalysis Basic - ( 17 Nov 2024 10:39 )    Color: x / Appearance: x / SG: x / pH: x  Gluc: 190 mg/dL / Ketone: x  / Bili: x / Urobili: x   Blood: x / Protein: x / Nitrite: x   Leuk Esterase: x / RBC: x / WBC x   Sq Epi: x / Non Sq Epi: x / Bacteria: x        MICROBIOLOGY:    Male    RADIOLOGY & ADDITIONAL TESTS:  I have personally reviewed the relevant images.   CXR      CT        WEIGHT  Weight (kg): 84.4 (11-17-24 @ 16:32)  Creatinine: 0.9 mg/dL (11-17-24 @ 10:39)      All available historical records have been reviewed

## 2024-11-19 ENCOUNTER — TRANSCRIPTION ENCOUNTER (OUTPATIENT)
Age: 59
End: 2024-11-19

## 2024-11-19 VITALS
OXYGEN SATURATION: 97 % | RESPIRATION RATE: 18 BRPM | DIASTOLIC BLOOD PRESSURE: 89 MMHG | HEART RATE: 58 BPM | TEMPERATURE: 98 F | SYSTOLIC BLOOD PRESSURE: 161 MMHG

## 2024-11-19 LAB
ALBUMIN SERPL ELPH-MCNC: 4 G/DL — SIGNIFICANT CHANGE UP (ref 3.5–5.2)
ALP SERPL-CCNC: 62 U/L — SIGNIFICANT CHANGE UP (ref 30–115)
ALT FLD-CCNC: 29 U/L — SIGNIFICANT CHANGE UP (ref 0–41)
ANION GAP SERPL CALC-SCNC: 8 MMOL/L — SIGNIFICANT CHANGE UP (ref 7–14)
AST SERPL-CCNC: 23 U/L — SIGNIFICANT CHANGE UP (ref 0–41)
BASOPHILS # BLD AUTO: 0.04 K/UL — SIGNIFICANT CHANGE UP (ref 0–0.2)
BASOPHILS NFR BLD AUTO: 0.8 % — SIGNIFICANT CHANGE UP (ref 0–1)
BILIRUB SERPL-MCNC: 1 MG/DL — SIGNIFICANT CHANGE UP (ref 0.2–1.2)
BUN SERPL-MCNC: 9 MG/DL — LOW (ref 10–20)
CALCIUM SERPL-MCNC: 8.6 MG/DL — SIGNIFICANT CHANGE UP (ref 8.4–10.5)
CHLORIDE SERPL-SCNC: 103 MMOL/L — SIGNIFICANT CHANGE UP (ref 98–110)
CO2 SERPL-SCNC: 30 MMOL/L — SIGNIFICANT CHANGE UP (ref 17–32)
CREAT SERPL-MCNC: 0.9 MG/DL — SIGNIFICANT CHANGE UP (ref 0.7–1.5)
EGFR: 97 ML/MIN/1.73M2 — SIGNIFICANT CHANGE UP
EOSINOPHIL # BLD AUTO: 0.15 K/UL — SIGNIFICANT CHANGE UP (ref 0–0.7)
EOSINOPHIL NFR BLD AUTO: 3.1 % — SIGNIFICANT CHANGE UP (ref 0–8)
GLUCOSE SERPL-MCNC: 101 MG/DL — HIGH (ref 70–99)
HCT VFR BLD CALC: 46.8 % — SIGNIFICANT CHANGE UP (ref 42–52)
HGB BLD-MCNC: 16.2 G/DL — SIGNIFICANT CHANGE UP (ref 14–18)
IMM GRANULOCYTES NFR BLD AUTO: 0.4 % — HIGH (ref 0.1–0.3)
LYMPHOCYTES # BLD AUTO: 1.86 K/UL — SIGNIFICANT CHANGE UP (ref 1.2–3.4)
LYMPHOCYTES # BLD AUTO: 38.4 % — SIGNIFICANT CHANGE UP (ref 20.5–51.1)
MAGNESIUM SERPL-MCNC: 2 MG/DL — SIGNIFICANT CHANGE UP (ref 1.8–2.4)
MCHC RBC-ENTMCNC: 30.9 PG — SIGNIFICANT CHANGE UP (ref 27–31)
MCHC RBC-ENTMCNC: 34.6 G/DL — SIGNIFICANT CHANGE UP (ref 32–37)
MCV RBC AUTO: 89.3 FL — SIGNIFICANT CHANGE UP (ref 80–94)
MONOCYTES # BLD AUTO: 0.67 K/UL — HIGH (ref 0.1–0.6)
MONOCYTES NFR BLD AUTO: 13.8 % — HIGH (ref 1.7–9.3)
NEUTROPHILS # BLD AUTO: 2.1 K/UL — SIGNIFICANT CHANGE UP (ref 1.4–6.5)
NEUTROPHILS NFR BLD AUTO: 43.5 % — SIGNIFICANT CHANGE UP (ref 42.2–75.2)
NRBC # BLD: 0 /100 WBCS — SIGNIFICANT CHANGE UP (ref 0–0)
PLATELET # BLD AUTO: 157 K/UL — SIGNIFICANT CHANGE UP (ref 130–400)
PMV BLD: 10.1 FL — SIGNIFICANT CHANGE UP (ref 7.4–10.4)
POTASSIUM SERPL-MCNC: 3.5 MMOL/L — SIGNIFICANT CHANGE UP (ref 3.5–5)
POTASSIUM SERPL-SCNC: 3.5 MMOL/L — SIGNIFICANT CHANGE UP (ref 3.5–5)
PROT SERPL-MCNC: 6.4 G/DL — SIGNIFICANT CHANGE UP (ref 6–8)
RBC # BLD: 5.24 M/UL — SIGNIFICANT CHANGE UP (ref 4.7–6.1)
RBC # FLD: 12.1 % — SIGNIFICANT CHANGE UP (ref 11.5–14.5)
SODIUM SERPL-SCNC: 141 MMOL/L — SIGNIFICANT CHANGE UP (ref 135–146)
WBC # BLD: 4.84 K/UL — SIGNIFICANT CHANGE UP (ref 4.8–10.8)
WBC # FLD AUTO: 4.84 K/UL — SIGNIFICANT CHANGE UP (ref 4.8–10.8)

## 2024-11-19 PROCEDURE — 99232 SBSQ HOSP IP/OBS MODERATE 35: CPT

## 2024-11-19 RX ORDER — BACITRACIN 50000 [IU]/1
0 INJECTION, POWDER, FOR SOLUTION INTRAMUSCULAR
Refills: 0 | DISCHARGE

## 2024-11-19 RX ORDER — PREDNISONE 20 MG/1
1 TABLET ORAL
Refills: 0 | DISCHARGE

## 2024-11-19 RX ORDER — BACITRACIN ZINC 500 UNIT/G
0 OINTMENT (GRAM) TOPICAL
Refills: 0 | DISCHARGE

## 2024-11-19 RX ORDER — ACYCLOVIR 200 MG
0 CAPSULE ORAL
Refills: 0 | DISCHARGE

## 2024-11-19 RX ORDER — VALACYCLOVIR HYDROCHLORIDE 1 G/1
1 TABLET, FILM COATED ORAL
Qty: 24 | Refills: 0
Start: 2024-11-19 | End: 2024-11-26

## 2024-11-19 RX ORDER — DOCOSANOL 100 MG/G
0 CREAM TOPICAL
Refills: 0 | DISCHARGE

## 2024-11-19 RX ADMIN — Medication 266 MILLIGRAM(S): at 10:29

## 2024-11-19 RX ADMIN — Medication 266 MILLIGRAM(S): at 02:58

## 2024-11-19 RX ADMIN — ENOXAPARIN SODIUM 40 MILLIGRAM(S): 30 INJECTION SUBCUTANEOUS at 12:17

## 2024-11-19 RX ADMIN — Medication 75 MILLILITER(S): at 10:28

## 2024-11-19 NOTE — DISCHARGE NOTE NURSING/CASE MANAGEMENT/SOCIAL WORK - FINANCIAL ASSISTANCE
[facility] provides services at a reduced cost to those who are determined to be eligible through [facility]’s financial assistance program. Information regarding [facility]’s financial assistance program can be found by going to [email or web address] or by calling [phone].

## 2024-11-19 NOTE — PROGRESS NOTE ADULT - TIME BILLING
On this date of service, level of risk to patient is considered: Moderate.   I have personally seen and examined this patient.    I have reviewed all pertinent clinical information and reviewed all relevant imaging and diagnostic studies personally.   I discussed recommendations with the primary team. I discussed recommendations with the primary team.

## 2024-11-19 NOTE — PROGRESS NOTE ADULT - SUBJECTIVE AND OBJECTIVE BOX
MORALES ESA  59y, Male    LOS  2d    INTERVAL EVENTS/HPI  - No acute events overnight  - T(F): , Max: 98.4 (11-18-24 @ 14:49)  - WBC Count: 4.84 (11-19-24 @ 07:31)  WBC Count: 5.19 (11-18-24 @ 05:30)  - Creatinine: 0.9 (11-18-24 @ 05:30)  Creatinine: 0.9 (11-17-24 @ 10:39)    REVIEW OF SYSTEMS:  CONSTITUTIONAL: No fever or chills  HEENT: No sore throat  RESPIRATORY: No cough, no shortness of breath  CARDIOVASCULAR: No chest pain or palpitations  GASTROINTESTINAL: No abdominal or epigastric pain  GENITOURINARY: No dysuria  NEUROLOGICAL: No headache/dizziness  MSK: No joint pain, erythema, or swelling; no back pain  SKIN: No itching, rashes  All other ROS negative except noted above    Prior hospital charts reviewed [Yes]  Primary team notes reviewed [Yes]  Other consultant notes reviewed [Yes]    ANTIMICROBIALS:   acyclovir IVPB 800 every 8 hours      OTHER MEDS: MEDICATIONS  (STANDING):  acetaminophen     Tablet .. 650 every 6 hours PRN  aluminum hydroxide/magnesium hydroxide/simethicone Suspension 30 every 4 hours PRN  enoxaparin Injectable 40 every 24 hours  ketorolac   Injectable 15 four times a day PRN  melatonin 3 at bedtime PRN  ondansetron Injectable 4 every 8 hours PRN      Vital Signs Last 24 Hrs  T(F): 98.2 (11-19-24 @ 06:49), Max: 98.6 (11-17-24 @ 20:31)    Vital Signs Last 24 Hrs  HR: 58 (11-19-24 @ 06:49) (58 - 85)  BP: 161/89 (11-19-24 @ 06:49) (117/77 - 161/89)  RR: 18 (11-19-24 @ 06:49)  SpO2: 97% (11-19-24 @ 06:49) (95% - 97%)  Wt(kg): --    EXAM:  GENERAL: Obese. Mandarin speaking.   HEAD: Painful swelling on the face with some vesicular lesions noted on the left face. Multiple dermatomes. Crusting.   NECK: Supple  CHEST/LUNG: Clear to auscultation bilaterally  HEART: S1 S2  ABDOMEN: Soft, nontender  EXTREMITIES: No clubbing  NERVOUS SYSTEM: Alert and oriented to person  MSK: No joint erythema, swelling or pain  SKIN: No rashes or lesions, no superficial thrombophlebitis    Labs:                        16.2   4.84  )-----------( 157      ( 19 Nov 2024 07:31 )             46.8     11-18    142  |  102  |  10  ----------------------------<  112[H]  3.8   |  31  |  0.9    Ca    8.7      18 Nov 2024 05:30    TPro  6.8  /  Alb  4.2  /  TBili  1.2  /  DBili  x   /  AST  23  /  ALT  28  /  AlkPhos  65  11-17      WBC Trend:  WBC Count: 4.84 (11-19-24 @ 07:31)  WBC Count: 5.19 (11-18-24 @ 05:30)  WBC Count: 6.08 (11-17-24 @ 10:39)      Creatine Trend:  Creatinine: 0.9 (11-18)  Creatinine: 0.9 (11-17)      Liver Biochemical Testing Trend:  Alanine Aminotransferase (ALT/SGPT): 28 (11-17)  Aspartate Aminotransferase (AST/SGOT): 23 (11-17-24 @ 10:39)  Bilirubin Total: 1.2 (11-17)      Trend LDH      Urinalysis Basic - ( 18 Nov 2024 05:30 )    Color: x / Appearance: x / SG: x / pH: x  Gluc: 112 mg/dL / Ketone: x  / Bili: x / Urobili: x   Blood: x / Protein: x / Nitrite: x   Leuk Esterase: x / RBC: x / WBC x   Sq Epi: x / Non Sq Epi: x / Bacteria: x        MICROBIOLOGY:    Male    HIV-1/2 Combo Result: Nonreact (11-18-24 @ 05:30)      RADIOLOGY & ADDITIONAL TESTS:  I have personally reviewed the relevant images.   CXR      CT      WEIGHT  Weight (kg): 84.4 (11-17-24 @ 16:32)      All available historical records have been reviewed       ANDREW ESA  59y, Male    LOS  2d    INTERVAL EVENTS/HPI  - No acute events overnight  - T(F): , Max: 98.4 (11-18-24 @ 14:49)  - WBC Count: 4.84 (11-19-24 @ 07:31)  WBC Count: 5.19 (11-18-24 @ 05:30)  - Creatinine: 0.9 (11-18-24 @ 05:30)  Creatinine: 0.9 (11-17-24 @ 10:39)    REVIEW OF SYSTEMS:  CONSTITUTIONAL: No fever or chills  HEENT: No sore throat  RESPIRATORY: No cough, no shortness of breath  CARDIOVASCULAR: No chest pain or palpitations  GASTROINTESTINAL: No abdominal or epigastric pain  GENITOURINARY: No dysuria  NEUROLOGICAL: No headache/dizziness  MSK: No joint pain, erythema, or swelling; no back pain  SKIN: No itching, rashes  All other ROS negative except noted above    Prior hospital charts reviewed [Yes]  Primary team notes reviewed [Yes]  Other consultant notes reviewed [Yes]    ANTIMICROBIALS:   acyclovir IVPB 800 every 8 hours      OTHER MEDS: MEDICATIONS  (STANDING):  acetaminophen     Tablet .. 650 every 6 hours PRN  aluminum hydroxide/magnesium hydroxide/simethicone Suspension 30 every 4 hours PRN  enoxaparin Injectable 40 every 24 hours  ketorolac   Injectable 15 four times a day PRN  melatonin 3 at bedtime PRN  ondansetron Injectable 4 every 8 hours PRN      Vital Signs Last 24 Hrs  T(F): 98.2 (11-19-24 @ 06:49), Max: 98.6 (11-17-24 @ 20:31)    Vital Signs Last 24 Hrs  HR: 58 (11-19-24 @ 06:49) (58 - 85)  BP: 161/89 (11-19-24 @ 06:49) (117/77 - 161/89)  RR: 18 (11-19-24 @ 06:49)  SpO2: 97% (11-19-24 @ 06:49) (95% - 97%)  Wt(kg): --    EXAM:  GENERAL: Obese. Mandarin speaking.   HEAD: Multiple dermatomes. Crusted.  NECK: Supple  CHEST/LUNG: Clear to auscultation bilaterally  HEART: S1 S2  ABDOMEN: Soft, nontender  EXTREMITIES: No clubbing  NERVOUS SYSTEM: Alert and oriented to person  MSK: No joint erythema, swelling or pain  SKIN: No rashes or lesions, no superficial thrombophlebitis    Labs:                        16.2   4.84  )-----------( 157      ( 19 Nov 2024 07:31 )             46.8     11-18    142  |  102  |  10  ----------------------------<  112[H]  3.8   |  31  |  0.9    Ca    8.7      18 Nov 2024 05:30    TPro  6.8  /  Alb  4.2  /  TBili  1.2  /  DBili  x   /  AST  23  /  ALT  28  /  AlkPhos  65  11-17      WBC Trend:  WBC Count: 4.84 (11-19-24 @ 07:31)  WBC Count: 5.19 (11-18-24 @ 05:30)  WBC Count: 6.08 (11-17-24 @ 10:39)      Creatine Trend:  Creatinine: 0.9 (11-18)  Creatinine: 0.9 (11-17)      Liver Biochemical Testing Trend:  Alanine Aminotransferase (ALT/SGPT): 28 (11-17)  Aspartate Aminotransferase (AST/SGOT): 23 (11-17-24 @ 10:39)  Bilirubin Total: 1.2 (11-17)      Trend LDH      Urinalysis Basic - ( 18 Nov 2024 05:30 )    Color: x / Appearance: x / SG: x / pH: x  Gluc: 112 mg/dL / Ketone: x  / Bili: x / Urobili: x   Blood: x / Protein: x / Nitrite: x   Leuk Esterase: x / RBC: x / WBC x   Sq Epi: x / Non Sq Epi: x / Bacteria: x        MICROBIOLOGY:    Male    HIV-1/2 Combo Result: Nonreact (11-18-24 @ 05:30)      RADIOLOGY & ADDITIONAL TESTS:  I have personally reviewed the relevant images.   CXR      CT      WEIGHT  Weight (kg): 84.4 (11-17-24 @ 16:32)      All available historical records have been reviewed

## 2024-11-19 NOTE — DISCHARGE NOTE PROVIDER - NSDCCPCAREPLAN_GEN_ALL_CORE_FT
PRINCIPAL DISCHARGE DIAGNOSIS  Diagnosis: Herpes zoster  Assessment and Plan of Treatment: You were treated with IV antiv iral and will be dischared on oral.  Your last day is 11/27.  You can take Gabapentin and tylenol for pain.  You should get the shingles vaccination 2-3 after lesions resolve.  FU outpatient with your PMD in 1 week.     PRINCIPAL DISCHARGE DIAGNOSIS  Diagnosis: Herpes zoster  Assessment and Plan of Treatment: You were treated with IV antiviral and will be dischared on an oral antiviral.  Your last day of the medication course is 11/27.  You can take Gabapentin and tylenol for pain.  You should get the shingles vaccination 2-3 after lesions resolve.  Followup outpatient with your primary care doctor in 1 week.

## 2024-11-19 NOTE — DISCHARGE NOTE PROVIDER - NSDCMRMEDTOKEN_GEN_ALL_CORE_FT
gabapentin 100 mg oral capsule: 1 cap(s) orally  Valtrex 1 g oral tablet: 1 tab(s) orally 3 times a day

## 2024-11-19 NOTE — DISCHARGE NOTE PROVIDER - HOSPITAL COURSE
60yo M with no significant pmhx whom brought to ed by his daughter in law whom is translating for him as per his request with c/o rash to the face for 3 days described as painful, causing moderate to severe pain, associated with swelling, and erythema. Pt denies associated fevers, chills, chest pain, sob, change in vision, or confusion. Pt denies hx of similar rash in the past or sick contacts. Pt started on acyclovir in ED for herpes zoster infection as per ID attending. Pt given toradol for pain.     #Disseminated herpes zoster infection  - Facial, mucosal involvment, no occular involvment  -airborne isolation  -ID consult- discussed case, requesting HSV and varicella pcr swab positive  -s/p IV acyclovir, changed to PO Valtrex on discharge to complete 10 day course (last day 11/27)  - HIV Antigen/ Ab negative  -toradol and tylneol prn pain  -Would recommend Shingles vaccine OP in several months after resolution

## 2024-11-19 NOTE — DISCHARGE NOTE NURSING/CASE MANAGEMENT/SOCIAL WORK - PATIENT PORTAL LINK FT
You can access the FollowMyHealth Patient Portal offered by [facility] by registering at the following website: [email or web address]. By joining [facility]’s FollowMyHealth portal, you will also be able to view your health information using other applications (apps) compatible with our system.

## 2024-11-19 NOTE — PROGRESS NOTE ADULT - ASSESSMENT
59 year old male with no pertinent PMH of presenting for facial rash.     IMPRESSION  #Shingles-Possibility of disseminated shingles due to several dermatomal involvements.   #Obesity   HIV screen negative.    RECOMMENDATIONS  -VZV PCR positive.  -IV acyclovir 10mg/kg q 8 hours. Gentle hydration to avoid crystallization. (S/D 11/17)  -On discharge can switch to PO Valtrex 1 g three times daily for 10 days from 11/17.   -Will benefit from shingrix vaccines in 1-2 months outpatient.    If any questions, please send a message or call on [employer]  Please continue to update ID with any pertinent new laboratory or radiographic findings.    Jimena Edge M.D  Infectious Diseases Attending/   [employer] at \[facility]\""/[facility]   59 year old male with no pertinent PMH of presenting for facial rash.     IMPRESSION  #Shingles-Possibility of disseminated shingles due to several dermatomal involvements.   #Obesity   HIV screen negative.    RECOMMENDATIONS  -VZV PCR positive.  -IV acyclovir 10mg/kg q 8 hours. Gentle hydration to avoid crystallization. (S/D 11/17)  -Switch to PO Valtrex 1 g three times daily for 10 days from 11/17.   -Will benefit from shingrix vaccines in 1-2 months outpatient.    If any questions, please send a message or call on [employer]  Please continue to update ID with any pertinent new laboratory or radiographic findings.    Jimena Edge M.D  Infectious Diseases Attending/   [employer] at [facility]

## 2024-11-21 LAB — VZV DNA, PCR RESULT: POSITIVE

## 2024-12-03 DIAGNOSIS — B02.7 DISSEMINATED ZOSTER: ICD-10-CM

## 2024-12-03 DIAGNOSIS — G47.00 INSOMNIA, UNSPECIFIED: ICD-10-CM

## 2024-12-03 DIAGNOSIS — E66.9 OBESITY, UNSPECIFIED: ICD-10-CM
